# Patient Record
Sex: FEMALE | Race: BLACK OR AFRICAN AMERICAN | Employment: UNEMPLOYED | URBAN - METROPOLITAN AREA
[De-identification: names, ages, dates, MRNs, and addresses within clinical notes are randomized per-mention and may not be internally consistent; named-entity substitution may affect disease eponyms.]

---

## 2017-10-11 ENCOUNTER — OFFICE VISIT (OUTPATIENT)
Dept: OBGYN CLINIC | Age: 27
End: 2017-10-11

## 2017-10-11 ENCOUNTER — HOSPITAL ENCOUNTER (OUTPATIENT)
Dept: LAB | Age: 27
Discharge: HOME | End: 2017-10-11
Payer: COMMERCIAL

## 2017-10-11 VITALS
DIASTOLIC BLOOD PRESSURE: 78 MMHG | HEART RATE: 68 BPM | SYSTOLIC BLOOD PRESSURE: 125 MMHG | BODY MASS INDEX: 30.66 KG/M2 | HEIGHT: 65 IN | WEIGHT: 184 LBS

## 2017-10-11 DIAGNOSIS — N93.9 ABNORMAL UTERINE BLEEDING (AUB): ICD-10-CM

## 2017-10-11 DIAGNOSIS — N93.9 ABNORMAL UTERINE BLEEDING (AUB): Primary | ICD-10-CM

## 2017-10-11 DIAGNOSIS — E66.9 OBESITY (BMI 30.0-34.9): ICD-10-CM

## 2017-10-11 PROCEDURE — 87491 CHLMYD TRACH DNA AMP PROBE: CPT | Performed by: OBSTETRICS & GYNECOLOGY

## 2017-10-11 NOTE — PATIENT INSTRUCTIONS
Abnormal Uterine Bleeding: Care Instructions  Your Care Instructions  Abnormal uterine bleeding (AUB) is irregular bleeding from the uterus that is longer or heavier than usual or does not occur at your regular time. Sometimes it is caused by changes in hormone levels. It can also be caused by growths in the uterus, such as fibroids or polyps. Sometimes a cause cannot be found. You may have heavy bleeding when you are not expecting your period. Your doctor may suggest a pregnancy test, if you think you are pregnant. Follow-up care is a key part of your treatment and safety. Be sure to make and go to all appointments, and call your doctor if you are having problems. It's also a good idea to know your test results and keep a list of the medicines you take. How can you care for yourself at home? · Be safe with medicines. Take pain medicines exactly as directed. ¨ If the doctor gave you a prescription medicine for pain, take it as prescribed. ¨ If you are not taking a prescription pain medicine, ask your doctor if you can take an over-the-counter medicine. · You may be low in iron because of blood loss. Eat a balanced diet that is high in iron and vitamin C. Foods rich in iron include red meat, shellfish, eggs, beans, and leafy green vegetables. Talk to your doctor about whether you need to take iron pills or a multivitamin. When should you call for help? Call 911 anytime you think you may need emergency care. For example, call if:  · You passed out (lost consciousness). Call your doctor now or seek immediate medical care if:  · You have sudden, severe pain in your belly or pelvis. · You have severe vaginal bleeding. You are soaking through your usual pads or tampons every hour for 2 or more hours. · You feel dizzy or lightheaded, or you feel like you may faint. Watch closely for changes in your health, and be sure to contact your doctor if:  · You have new belly or pelvic pain.   · You have a fever.  · Your bleeding gets worse or lasts longer than 1 week. · You think you may be pregnant. Where can you learn more? Go to http://theresa-stacy.info/. Enter E704 in the search box to learn more about \"Abnormal Uterine Bleeding: Care Instructions. \"  Current as of: October 13, 2016  Content Version: 11.3  © 8010-3636 [employer]. Care instructions adapted under license by [employer] (which disclaims liability or warranty for this information). If you have questions about a medical condition or this instruction, always ask your healthcare professional. [place] [address] any warranty or liability for your use of this information.

## 2017-10-11 NOTE — PROGRESS NOTES
Roge Lou is a 23 y.o. female presents today c/o painful and abnormal vaginal bleeding for the past 2 years. Thinks her unintentional BTL caused it. States she was told she signed the consent for BTL just prior to her last LTCS, but claims that's not what she signed for. Reports heavy menses since then, regular interval and duration but heavy flow, wears large pads (diaper type) and soaks q 3-4 hours, passing large clots. Associated with severe menstrual cramps, unrelieved by NSAIDs and heat. Takes narcotics from ER. Agrees this is not good for long term management. Denies dizziness. Review of Systems:   General ROS: negative for - fever, chills, malaise  Endocrine ROS: negative for -  breast tenderness/mass/nipple discharge/galactorrhea, hair pattern changes, skin changes or temperature intolerance. Respiratory ROS: no cough, shortness of breath, or wheezing  Cardiovascular ROS: no chest pain or dyspnea on exertion  Gastrointestinal ROS: no abdominal pain, change in bowel habits, or black or bloody stools, nausea, vomiting  Genito-Urinary ROS: no dysuria, trouble voiding, incontinence or hematuria. No unusual discharge, vaginal dryness, sx of pelvic organ prolapse. Musculoskeletal ROS: negative  Neurological ROS: no TIA or stroke symptoms  Dermatological ROS: negative    OB History      Para Term  AB Living    10 8 5 3 2 8    SAB TAB Ectopic Molar Multiple Live Births    2     8        Gyn hx:  Regular since menarche.   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 Social History Main Topics    Smoking status: Never Smoker    Smokeless tobacco: Never Used    Alcohol use No    Drug use: Yes     Special: Marijuana      Comment: occasionally    Sexual activity: Yes     Partners: Male     Birth control/ protection: Surgical     Other Topics Concern    Blood Transfusions No     Social History Narrative    No narrative on file     No Known Allergies  Prior to Admission medications    Medication Sig Start Date End Date Taking? Authorizing Provider   FERROUS SULFATE, DRIED (IRON, DRIED, PO) Take  by mouth. Yes Historical Provider        Objective:     Vitals:    10/11/17 1359   BP: 125/78   Pulse: 68   Weight: 184 lb (83.5 kg)   Height: 5' 5\" (1.651 m)     Body mass index is 30.62 kg/(m^2). Physical Exam:  General appearance - well appearing, and in no distress and well hydrated  Mental status - alert, oriented to person, place, and time, normal mood, behavior, speech, dress, motor activity, and thought processes  Respiratory:  Normal respiratory effort, no intercostal retractions or use of accessory muscles. Abdomen - soft, nontender, nondistended, no masses or organomegaly  Pelvic - No inguinal lymphadenopathy, normal urethral meatus and no bladder tenderness. VULVA: normal appearing vulva with no masses, tenderness or lesions, VAGINA: normal appearing vagina with normal color and moderate blood in vault, no lesions, PELVIC FLOOR EXAM: no cystocele, rectocele or prolapse noted, DNA probe for chlamydia and GC obtained, CERVIX: normal appearing cervix without discharge or lesions, UTERUS: uterus is enlarged approximately 12 cm, bulky and nontender, mobile, ADNEXA: normal adnexa in size, nontender and no masses,   Extremities - No edema. Skin - normal coloration and turgor, no rashes, no suspicious skin lesions noted    Assessment/Plan:       ICD-10-CM ICD-9-CM    1. Abnormal uterine bleeding (AUB) N93.9 626.9 US PELV NON OB W TV      CHLAMYDIA/NEISSERIA/TRICHOMONAS AMP   2. Obesity (BMI 30.0-34. 9) E66.9 278.00      DDX of AUB using PALM COEIN classification discussed.  We focussed our discussion on hormonal fluctuations especially as it relates to obesity/peripheral conversion of estrogen and its association with endometrial hyperplasia/malignancy as well as structural abnormalities such as polyps/fibroids  Pelvic US recommended. We briefly discussed observation vs medical vs surgical management based on current symptoms.    Caloric restrictions and regular exercise strongly encouraged. Follow up 2 weeks to discuss US findings and plan of care. Plan of care discussed. Patient expressed understanding and agreement.              Signed By:  Pavel Dinh DO     October 11, 2017

## 2017-10-13 DIAGNOSIS — A59.01 TRICHOMONAL VAGINITIS: Primary | ICD-10-CM

## 2017-10-13 LAB
C TRACH RRNA SPEC QL NAA+PROBE: NEGATIVE
N GONORRHOEA RRNA SPEC QL NAA+PROBE: NEGATIVE
SPECIMEN SOURCE: ABNORMAL
T VAGINALIS RRNA SPEC QL NAA+PROBE: POSITIVE

## 2017-10-13 RX ORDER — METRONIDAZOLE 500 MG/1
2000 TABLET ORAL ONCE
Qty: 4 TAB | Refills: 0 | Status: SHIPPED | OUTPATIENT
Start: 2017-10-13 | End: 2017-10-13

## 2017-10-13 NOTE — PROGRESS NOTES
rx sent to pharmacy. pls notify pt and advise to abstain at least 7 days after pt and partner are treated.

## 2017-10-16 NOTE — PROGRESS NOTES
Patient notified and voices understandin. Positive Trichomonas,  2. Rx sent to [facility]. Review instructions to have Rx transferred to her preferred pharmacy. 3. Patient partner can be treated at local health dept. Advised to abstain x7 days once pt and partner completes treatment.

## 2020-05-18 ENCOUNTER — VIRTUAL VISIT (OUTPATIENT)
Dept: FAMILY MEDICINE CLINIC | Age: 30
End: 2020-05-18

## 2020-05-18 ENCOUNTER — HOSPITAL ENCOUNTER (OUTPATIENT)
Dept: LAB | Age: 30
Discharge: HOME | End: 2020-05-18
Payer: COMMERCIAL

## 2020-05-18 DIAGNOSIS — E66.3 OVERWEIGHT: ICD-10-CM

## 2020-05-18 DIAGNOSIS — Z86.2 HISTORY OF ANEMIA: ICD-10-CM

## 2020-05-18 DIAGNOSIS — I10 HYPERTENSION, UNSPECIFIED TYPE: ICD-10-CM

## 2020-05-18 DIAGNOSIS — L93.0 LUPUS ERYTHEMATOSUS, UNSPECIFIED FORM: ICD-10-CM

## 2020-05-18 DIAGNOSIS — R87.619 ABNORMAL CERVICAL PAPANICOLAOU SMEAR, UNSPECIFIED ABNORMAL PAP FINDING: ICD-10-CM

## 2020-05-18 DIAGNOSIS — M54.41 ACUTE RIGHT-SIDED LOW BACK PAIN WITH RIGHT-SIDED SCIATICA: Primary | ICD-10-CM

## 2020-05-18 LAB
ALBUMIN SERPL-MCNC: 4 G/DL (ref 3.4–5)
ALBUMIN/GLOB SERPL: 1.1 {RATIO} (ref 0.8–1.7)
ALP SERPL-CCNC: 82 U/L (ref 45–117)
ALT SERPL-CCNC: 14 U/L (ref 13–56)
ANION GAP SERPL CALC-SCNC: 5 MMOL/L (ref 3–18)
APPEARANCE UR: ABNORMAL
AST SERPL-CCNC: 12 U/L (ref 10–38)
BACTERIA URNS QL MICRO: ABNORMAL /HPF
BASOPHILS # BLD: 0 K/UL (ref 0–0.1)
BASOPHILS NFR BLD: 0 % (ref 0–2)
BILIRUB SERPL-MCNC: 0.3 MG/DL (ref 0.2–1)
BILIRUB UR QL: NEGATIVE
BUN SERPL-MCNC: 11 MG/DL (ref 7–18)
BUN/CREAT SERPL: 15 (ref 12–20)
CALCIUM SERPL-MCNC: 9.2 MG/DL (ref 8.5–10.1)
CHLORIDE SERPL-SCNC: 112 MMOL/L (ref 100–111)
CO2 SERPL-SCNC: 25 MMOL/L (ref 21–32)
COLOR UR: YELLOW
CREAT SERPL-MCNC: 0.74 MG/DL (ref 0.6–1.3)
DIFFERENTIAL METHOD BLD: ABNORMAL
EOSINOPHIL # BLD: 0 K/UL (ref 0–0.4)
EOSINOPHIL NFR BLD: 0 % (ref 0–5)
EPITH CASTS URNS QL MICRO: ABNORMAL /LPF (ref 0–5)
ERYTHROCYTE [DISTWIDTH] IN BLOOD BY AUTOMATED COUNT: 14.6 % (ref 11.6–14.5)
EST. AVERAGE GLUCOSE BLD GHB EST-MCNC: 123 MG/DL
FERRITIN SERPL-MCNC: 37 NG/ML (ref 8–388)
GLOBULIN SER CALC-MCNC: 3.7 G/DL (ref 2–4)
GLUCOSE SERPL-MCNC: 96 MG/DL (ref 74–99)
GLUCOSE UR STRIP.AUTO-MCNC: NEGATIVE MG/DL
HBA1C MFR BLD: 5.9 % (ref 4.2–5.6)
HCT VFR BLD AUTO: 35.2 % (ref 35–45)
HGB BLD-MCNC: 11.9 G/DL (ref 12–16)
HGB UR QL STRIP: NEGATIVE
IRON SATN MFR SERPL: 11 % (ref 20–50)
IRON SERPL-MCNC: 41 UG/DL (ref 50–175)
KETONES UR QL STRIP.AUTO: NEGATIVE MG/DL
LEUKOCYTE ESTERASE UR QL STRIP.AUTO: ABNORMAL
LYMPHOCYTES # BLD: 2.8 K/UL (ref 0.9–3.6)
LYMPHOCYTES NFR BLD: 39 % (ref 21–52)
MCH RBC QN AUTO: 28.8 PG (ref 24–34)
MCHC RBC AUTO-ENTMCNC: 33.8 G/DL (ref 31–37)
MCV RBC AUTO: 85.2 FL (ref 74–97)
MONOCYTES # BLD: 0.5 K/UL (ref 0.05–1.2)
MONOCYTES NFR BLD: 7 % (ref 3–10)
NEUTS SEG # BLD: 3.9 K/UL (ref 1.8–8)
NEUTS SEG NFR BLD: 54 % (ref 40–73)
NITRITE UR QL STRIP.AUTO: NEGATIVE
PH UR STRIP: 6.5 [PH] (ref 5–8)
PLATELET # BLD AUTO: 178 K/UL (ref 135–420)
PMV BLD AUTO: 13.7 FL (ref 9.2–11.8)
POTASSIUM SERPL-SCNC: 3.8 MMOL/L (ref 3.5–5.5)
PROT SERPL-MCNC: 7.7 G/DL (ref 6.4–8.2)
PROT UR STRIP-MCNC: NEGATIVE MG/DL
RBC # BLD AUTO: 4.13 M/UL (ref 4.2–5.3)
RBC #/AREA URNS HPF: NEGATIVE /HPF (ref 0–5)
SODIUM SERPL-SCNC: 142 MMOL/L (ref 136–145)
SP GR UR REFRACTOMETRY: 1.02 (ref 1–1.03)
TIBC SERPL-MCNC: 389 UG/DL (ref 250–450)
TSH SERPL DL<=0.05 MIU/L-ACNC: 0.86 UIU/ML (ref 0.36–3.74)
UROBILINOGEN UR QL STRIP.AUTO: 1 EU/DL (ref 0.2–1)
WBC # BLD AUTO: 7.2 K/UL (ref 4.6–13.2)
WBC URNS QL MICRO: ABNORMAL /HPF (ref 0–4)

## 2020-05-18 PROCEDURE — 86225 DNA ANTIBODY NATIVE: CPT

## 2020-05-18 PROCEDURE — 84443 ASSAY THYROID STIM HORMONE: CPT

## 2020-05-18 PROCEDURE — 80053 COMPREHEN METABOLIC PANEL: CPT

## 2020-05-18 PROCEDURE — 83036 HEMOGLOBIN GLYCOSYLATED A1C: CPT

## 2020-05-18 PROCEDURE — 81001 URINALYSIS AUTO W/SCOPE: CPT

## 2020-05-18 PROCEDURE — 85025 COMPLETE CBC W/AUTO DIFF WBC: CPT

## 2020-05-18 PROCEDURE — 82728 ASSAY OF FERRITIN: CPT

## 2020-05-18 PROCEDURE — 83540 ASSAY OF IRON: CPT

## 2020-05-18 PROCEDURE — 36415 COLL VENOUS BLD VENIPUNCTURE: CPT

## 2020-05-18 RX ORDER — CYCLOBENZAPRINE HCL 10 MG
10 TABLET ORAL
Qty: 30 TAB | Refills: 0 | Status: SHIPPED | OUTPATIENT
Start: 2020-05-18

## 2020-05-18 RX ORDER — DICLOFENAC SODIUM 50 MG/1
50 TABLET, DELAYED RELEASE ORAL 2 TIMES DAILY
Qty: 30 TAB | Refills: 0 | Status: SHIPPED | OUTPATIENT
Start: 2020-05-18 | End: 2020-06-30 | Stop reason: SDUPTHER

## 2020-05-18 NOTE — PROGRESS NOTES
Cassy Zepeda is a 30 y.o. female who was seen by synchronous (real-time) audio-video technology on 5/18/2020. Consent: Cassy Zepeda, who was seen by synchronous (real-time) audio-video technology, and/or her healthcare decision maker, is aware that this patient-initiated, Telehealth encounter on 5/18/2020 is a billable service, with coverage as determined by her insurance carrier. She is aware that she may receive a bill and has provided verbal consent to proceed: Yes. Assessment & Plan:   Diagnoses and all orders for this visit:    1. Acute right-sided low back pain with right-sided sciatica  Will treat symptomatically today- patient to activate [employer] so we can message and I can send her information on back exercises. If no improvement in 4 weeks consider imaging, referral to PT/Ortho.    -     diclofenac EC (VOLTAREN) 50 mg EC tablet; Take 1 Tab by mouth two (2) times a day. -     cyclobenzaprine (FLEXERIL) 10 mg tablet; Take 1 Tab by mouth daily as needed for Muscle Spasm(s). 2. Lupus erythematosus, unspecified form  Will obtain lab work today; will also preemptively refer to rheumatology for management  -     [address]; Future  -     CBC WITH AUTOMATED DIFF; Future  -     OSIEL COMPREHENSIVE PANEL; Future  -     URINALYSIS W/ RFLX MICROSCOPIC; Future    3. Hypertension, unspecified type  Screening lab work today; will follow up in 4 weeks- begin treatment if indicated. Today educated on low salt diet, exercise. 4. History of anemia  Labs ordered today  -     CBC WITH AUTOMATED DIFF; Future  -     IRON PROFILE; Future  -     FERRITIN; Future    5. Abnormal cervical Papanicolaou smear, unspecified abnormal pap finding  No records available but will refer to GYN for management.   -     REFERRAL TO GYNECOLOGY    6. Overweight  Some labs ordered today. Educated on diet and exercise. Once [employer] activated will send further information.  Follow up for weight in office.   -     HEMOGLOBIN A1C WITH EAG; Future  -     TSH 3RD GENERATION; Future    Follow-up and Dispositions    · Return in about 4 weeks (around 6/15/2020), or if symptoms worsen or fail to improve, for in office evaluation; labs ASAP. Fanny Sahu I spent at least 29 minutes on this visit with this new patient. [phone]  Subjective:   Karen Chandler is a 30 y.o. female who was seen for Establish Care    Establishing care today- has not seen PCP previously. Has followed intermittently with GYN with pregnancies. Has 8 children. Various acute and chronic complaints today detailed below. Back pain- this flared up 2.5 weeks ago after injury at work. 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 Located lower back, mostly on right side. Certain movements make it worse, numbness goes down right leg. She reports muscle spasms and tightness in back too. For relief has used topical patch with minimal relief, but some help. Rates pain 10/10. Has not tried OTC medication tylenol as she has been dizzy after using this before. Has not tried OTC motrin, although she does have 800 mg ibuprofen has not used because it is prescribed for period cramps and does not want to waste it. Per chart review as upcoming appointment with spine specialist, Dr. Donna Chatman at [facility]. Lupus- flare up described today with rash noted to face, arms. Also states kidneys have been affected but did not detail how. She reports that this was diagnosed at 7years old. She states she had overdosed and lupus discovered on lab work. No labs available indicating lupus. She reports strong family history of lupus as well, mother dying in 2017. She states she has never followed with rheumatology before. HTN- diagnosed with pregnancy previously. Reports having to deliver babies prematurely due to BP concerns.  Has been noted intermittently outside of pregnancy- patient monitors occasionally with BP monitor for work and [id number]. Does sometime report intermittent dizziness, headaches, swollen feet, tingling hands. Has not been diagnosed outside of pregnancy. She does report she took 'something' for high blood pressure previously, but weaned self off it. Anemia- patient reports iron deficiency anemia previously and was supposed to be on iron supplements. Issues with GI upset and constipation so she stopped. Does report intermittent dizziness, feeling cold. Abnormal pap smears/Pre-cancerous cells- difficult to obtain full information from patient. She reports she did have LEEP procedure and pre cancerous cells found. This was in 2018, per patient. No records available. She has not followed up. Offered PAP smear here, but decided to preemptively refer to GYN for management as likely will be abnormal.     Overweight- no current weight on file, no scale at home. Patient reports going from size 11 - 13 in pants size. Patient also asking to be screened for diabetes due to family history. No Known Allergies    Patient Active Problem List   Diagnosis Code    Abnormal uterine bleeding (AUB) N93.9    Obesity (BMI 30.0-34. 9) E66.9    Trichomonal vaginitis A59.01       Past Medical History:   Diagnosis Date    Abnormal Papanicolaou smear of cervix     biopsy     Anemia     reports iron deficinecy anemia, not treated presently    Autoimmune disease ([address] 2003    lupus    Cancer ([facility] [address]     pre-cancerous cells seen on LEEP procedure per patient    Hypertension     diagnosed in pregnancy; has not seen provider outside of pregnancy    Preeclampsia     Trichomonal vaginitis 10/13/2017     Past Surgical History:   Procedure Laterality Date    HX  SECTION      HX LEEP PROCEDURE      HX TUBAL LIGATION       Family History   Problem Relation Age of Onset    Diabetes Mother     Arthritis-osteo Mother     Heart Disease Mother     Hypertension Mother     Lupus Mother     Diabetes Maternal Aunt      Social History     Tobacco Use    Smoking status: Never Smoker    Smokeless tobacco: Never Used   Substance Use Topics    Alcohol use: No       Review of Systems   Constitutional: Positive for malaise/fatigue. Negative for chills and fever. Eyes: Negative for blurred vision. Respiratory: Negative for cough, sputum production and shortness of breath. Cardiovascular: Positive for leg swelling. Negative for chest pain, palpitations, orthopnea and claudication. Genitourinary: Negative for dysuria, flank pain and urgency. Musculoskeletal: Positive for back pain and myalgias. Skin: Positive for rash. Negative for itching. Neurological: Positive for dizziness, tingling and headaches. Negative for tremors, speech change, focal weakness and weakness. Endo/Heme/Allergies: Negative for polydipsia. Objective: There were no vitals taken for this visit. General: alert, cooperative, no distress   Mental  status: normal mood, behavior, speech, dress, motor activity, and thought processes, able to follow commands   HENT: NCAT   Neck: no visualized mass   Resp: no respiratory distress   Neuro: no gross deficits   Skin: no discoloration or lesions of concern on visible areas   Psychiatric: normal affect, consistent with stated mood, no evidence of hallucinations     Additional exam findings: appeared well, non-toxic on video encounter. Small papular rash noted to backside of left arm. We discussed the expected course, resolution and complications of the diagnosis(es) in detail. Medication risks, benefits, costs, interactions, and alternatives were discussed as indicated. I advised her to contact the office if her condition worsens, changes or fails to improve as anticipated. She expressed understanding with the diagnosis(es) and plan.      Claribel Osorio is a 30 y.o. female who was evaluated by a video visit encounter for concerns as above. Patient identification was verified prior to start of the visit. A caregiver was present when appropriate. Due to this being a TeleHealth encounter (During DVBEY-02 public health emergency), evaluation of the following organ systems was limited: Vitals/Constitutional/EENT/Resp/CV/GI//MS/Neuro/Skin/Heme-Lymph-Imm. Pursuant to the emergency declaration under the [facility] waiver authority and the [employer] Act, this Virtual  Visit was conducted, with patient's (and/or legal guardian's) consent, to reduce the patient's risk of exposure to COVID-19 and provide necessary medical care. Services were provided through a video synchronous discussion virtually to substitute for in-person clinic visit. Patient and provider were located at their individual homes.       Garry Watters NP

## 2020-05-18 NOTE — Clinical Note
Please help Ms La Zavala get scheduled for in office visit in 4-6 weeks. Labs ordered she will get done outpatient at [facility] or [facility]. 2 referrals today- rheumatology and gyn.

## 2020-05-19 ENCOUNTER — TELEPHONE (OUTPATIENT)
Dept: FAMILY MEDICINE CLINIC | Age: 30
End: 2020-05-19

## 2020-05-19 NOTE — TELEPHONE ENCOUNTER
Patient stated that she did not know about the appointment that was set up with [facility]-we can disregard any calls from [facility]

## 2020-05-19 NOTE — TELEPHONE ENCOUNTER
attempted to call patient to review lab resulted so far, no answer. Voicemail box full. Will attempt [employer] message.

## 2020-05-19 NOTE — TELEPHONE ENCOUNTER
[facility] called stating patient contacted them about a referral for lower back pain. (I didn't see a referral for this) They do not take patient's insurance.

## 2020-05-20 ENCOUNTER — TELEPHONE (OUTPATIENT)
Dept: FAMILY MEDICINE CLINIC | Age: 30
End: 2020-05-20

## 2020-05-20 DIAGNOSIS — N39.0 URINARY TRACT INFECTION WITHOUT HEMATURIA, SITE UNSPECIFIED: Primary | ICD-10-CM

## 2020-05-20 DIAGNOSIS — M79.89 SWELLING OF RIGHT HAND: ICD-10-CM

## 2020-05-20 LAB
CENTROMERE B AB SER-ACNC: <0.2 AI (ref 0–0.9)
CHROMATIN AB SERPL-ACNC: <0.2 AI (ref 0–0.9)
DSDNA AB SER-ACNC: 1 IU/ML (ref 0–9)
ENA JO1 AB SER-ACNC: <0.2 AI (ref 0–0.9)
ENA RNP AB SER-ACNC: 0.2 AI (ref 0–0.9)
ENA SCL70 AB SER-ACNC: <0.2 AI (ref 0–0.9)
ENA SM AB SER-ACNC: <0.2 AI (ref 0–0.9)
ENA SS-A AB SER-ACNC: <0.2 AI (ref 0–0.9)
ENA SS-B AB SER-ACNC: <0.2 AI (ref 0–0.9)
SEE BELOW, 164869: NORMAL

## 2020-05-20 NOTE — TELEPHONE ENCOUNTER
Patient called for her lab results. Pt want to be contacted today if possible. Please follow up when available.

## 2020-05-21 ENCOUNTER — VIRTUAL VISIT (OUTPATIENT)
Dept: ORTHOPEDIC SURGERY | Age: 30
End: 2020-05-21

## 2020-05-21 DIAGNOSIS — M54.31 RIGHT SIDED SCIATICA: Primary | ICD-10-CM

## 2020-05-21 DIAGNOSIS — S39.012A STRAIN OF LUMBAR REGION, INITIAL ENCOUNTER: ICD-10-CM

## 2020-05-21 RX ORDER — NITROFURANTOIN 25; 75 MG/1; MG/1
100 CAPSULE ORAL 2 TIMES DAILY
Qty: 10 CAP | Refills: 0 | Status: SHIPPED | OUTPATIENT
Start: 2020-05-21 | End: 2020-06-30

## 2020-05-21 RX ORDER — PREDNISONE 5 MG/1
TABLET ORAL
Qty: 21 TAB | Refills: 0 | Status: SHIPPED | OUTPATIENT
Start: 2020-05-21 | End: 2020-06-30

## 2020-05-21 NOTE — TELEPHONE ENCOUNTER
Returned patient's call at this time. Varied concerns. 1. Right hand swollen, red. She reports this is a lupus flare up. reittereated that OSIEL negative on labs but nonetheless she needs to follow up with rheumatologist previously referred to. She is in agreement. She states that it is swollen and causing the skin to feel tight, and red. Not necessarily painful. She denies any trauma to the area, no bite, etc. She is requesting a steroid. 2. UTI symptoms- labs reviewed with bacteria present in UA. Will treat with macrobid. 3. Pre-diabetes- patient confused on diagnosis of this, asking for medication. Advised that while treatment with metformin is possibility, we will first allow for lifestyle changes. Advised on diet and exercise, will re-check A1c q3 months. Discussed side effects associated with metformin and if she can manage without medication this would be preferred.

## 2020-05-21 NOTE — TELEPHONE ENCOUNTER
Patient called advising that her right hand is swollen, numb, tingling and is bright red. Patient woke up this morning like this. No pain no fever. Please call [phone].      Lab results were given and patient verbalized understanding
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
## 2020-05-21 NOTE — PROGRESS NOTES
Verbal order entered per Dr. Telly Burt as documented on blue sheet:Referral to PT    Faiza Carvajal presents today for   Chief Complaint   Patient presents with    Back Pain     Virtual Visit    Hand Pain       Is someone accompanying this pt? Virtual Visit    Is the patient using any DME equipment during OV? NA    Coordination of Care:  1. Have you been to the ER, urgent care clinic since your last visit? NO  Hospitalized since your last visit? NO    2. Have you seen or consulted any other health care providers outside of the [address] [place] [address] since your last visit? NO Include any pap smears or colon screening.  NO    Last  Checked 5/21/2020

## 2020-05-28 ENCOUNTER — HOSPITAL ENCOUNTER (OUTPATIENT)
Dept: PHYSICAL THERAPY | Age: 30
Discharge: HOME | End: 2020-05-28
Payer: COMMERCIAL

## 2020-05-28 PROCEDURE — 97162 PT EVAL MOD COMPLEX 30 MIN: CPT

## 2020-05-28 NOTE — PROGRESS NOTES
[facility]              [address] [facility]        [place], [address]   [phone] fax    Plan of Care/ Statement of Necessity for Physical Therapy Services  Patient name: Michael Bonds Start of Care: 2020   Referral source: Oxana Londono MD : 1990    Medical Diagnosis: Low back pain [M54.5]  Payor: Enrrique Tang / Plan: 1500 / Product Type: Medicaid /  Onset Date:2020    Treatment Diagnosis: Right Lumbar Strain   Prior Hospitalization: see medical history Provider#: [id number]   Medications: Verified on Patient summary List    Comorbidities: Diabetes Type I, HTN   Prior Level of Function: (I) Functional ADLs, (I) Self-Care ADLs, Work Part-Time, Mother (children x8)     The Plan of Care and following information is based on the information from the initial evaluation. Assessment:    Patient presents with s/s consistent with right lumbar strain with normal neurological screen and without (+) red flag findings. Patient does report intermittent dizziness and bilateral UE numbness/tingling with patient advised to address with PCP with recent diagnosis of Diabetes mellitus Type I. Poor recruitment of anterior abdominal musculature with (+) Frackville's sign demonstrated. To promote improvement in recruitment and activation of the anterior abdominal and gluteal musculature with progressive stretching of strained musculature to improve activity tolerance and return to PLOF.     Patient will continue to benefit from skilled PT services to modify and progress therapeutic interventions, address functional mobility deficits, address ROM deficits, address strength deficits, analyze and address soft tissue restrictions, analyze and cue movement patterns, analyze and modify body mechanics/ergonomics and assess and modify postural abnormalities to attain remaining goals.     Key Information:    Posture: Normal; No observable lateral shift, Symmetrical positioning of iliac crest bilaterally     Gait:       [x]? Normal        Active Movements: []? N/A   []? Too acute   []? Other:  ROM % AROM Comments   Forward flexion 40-60 10% limited Central lumbar pull   Extension 20-30 90% limited Pain 10/10   SB right 20-30 50% limited     SB left 20-30 50% limited Right lumbar stretch      Neuro Screen []? WNL  Dermatome: Symmetrical sensation to light touch bilaterally L2-S1  Reflexes: 0+ L/R Patellar, 2+ L/R Achilles     Dural Mobility:  SLR Supine: (-) Right    Evaluation Complexity History MEDIUM  Complexity : 1-2 comorbidities / personal factors will impact the outcome/ POC ; Examination MEDIUM Complexity : 3 Standardized tests and measures addressing body structure, function, activity limitation and / or participation in recreation  ;Presentation MEDIUM Complexity : Evolving with changing characteristics  ; Clinical Decision Making MEDIUM Complexity : FOTO score of 26-74  Overall Complexity Rating: MEDIUM  Problem List: pain affecting function, decrease ROM, decrease strength, decrease ADL/ functional abilitiies, decrease activity tolerance, decrease flexibility/ joint mobility and decrease transfer abilities   Treatment Plan may include any combination of the following: Therapeutic exercise, Therapeutic activities, Neuromuscular re-education, Physical agent/modality, Manual therapy, Patient education, Self Care training, Functional mobility training and Home safety training  Patient / Family readiness to learn indicated by: asking questions, trying to perform skills and interest  Persons(s) to be included in education: patient (P)  Barriers to Learning/Limitations: None  Patient Goal (s): Be able to move normal without pain  Patient Self Reported Health Status: fair  Rehabilitation Potential: good    Short Term Goals: To be accomplished in 3 weeks:  1. Patient will subjectively report full compliance with prescribed HEP. Eval: HEP provided  2. Patient will demonstrate lumbar extension AROM </= 50% limited with pain < 5/10 to improve overhead lifting. Eval: Lumbar Extension AROM = 90% limited (lumbar pain 10/10)  3. Patient will demonstrate left/right lumbar sidebend AROM <25% limited to improve ease with household ADLs. Eval: Left Lumbar Sidebend 50% limited, Right Lumbar Sidebend 50% limited     Long Term Goals: To be accomplished in 6 weeks:  1. Patient will demonstrate a significant functional improvement as demonstrated by a score of >/= 69 on FOTO. Eval: FOTO = 51       2. Patient will subjectively report >/= 60% improvement in sleep disturbances to promote improvement in overall quality of life. Eval: 0%  3. Patient will report pain at worse over the last week 5/10 to improve ease with work-related ADLs. Eval: Pain at Worst = 9/10    Frequency / Duration: Patient to be seen 2 times per week for 6 weeks. Patient/ Caregiver education and instruction: Diagnosis, prognosis, self care, activity modification and exercises   [x]  Plan of care has been reviewed with ANDREEA Cheung, PT 5/28/2020 2:29 PM  ________________________________________________________________________    I certify that the above Therapy Services are being furnished while the patient is under my care. I agree with the treatment plan and certify that this therapy is necessary.     Physician's Signature:____________Date:_________TIME:________    ** Signature, Date and Time must be completed for valid certification **  Please sign and return to [facility]/ [address]              [address] [facility], [address]   [phone] fax

## 2020-05-28 NOTE — PROGRESS NOTES
PT DAILY TREATMENT NOTE 10-18    Patient Name: Arlene Felix  Date:2020  : 1990  [x]  Patient  Verified  Payor: [place] / Plan: 1500  / Product Type: Medicaid /    In time:146  Out time:220  Total Treatment Time (min): 34  Visit #: 1 of 12    Treatment Area: Low back pain [M54.5]    Physical Therapy Evaluation - Lumbar    SUBJECTIVE      Any medication changes, allergies to medications, adverse drug reactions, diagnosis change, or new procedure performed?: [x] No    [] Yes (see summary sheet for update)    Subjective functional status/changes:     PLOF: (I) Functional ADLs, (I) Self-Care ADLs, Work Part-Time, Mother (children x8)  Current Functional Status: Difficulty with household ADLs, Difficulty with self-care ADLs, Difficulty with bending, squatting, UE overhead movements  Work Hx: [employer]  Living Situation: Lives with family  Comorbidities: Diabetes Type I, HTN  Medications: None    Subjective: Patient reports right lumbar pain with onset reported while patient was working with a bed-bound patient (PCA) with the middle of 2020 with patient attempting to transfer the patient when the patient went limp with jolting movement. Patient reports immediate onset of pain to the right lumbar region with radiation of symptoms along the right anterior thigh. Patient reports diminished sensation to bilateral fingers/hands and swelling. Patient denies LE N/T with patient denying the following: falls, changes in bowel/bladders. 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 Patient as well reports intermittent dizziness with patient reporting onset ~2020. Pain Intensity (0-10, VAS): Current 9, Worst 9, Best 7    Patient Goals:  \"Be able to move normal without pacing\"    OBJECTIVE EXAMINATION    BP: 108/74 mmHg  Posture: Normal; No observable lateral shift, Symmetrical positioning of iliac crest bilaterally    Gait:  [x] Normal     [] Abnormal:    Functional Tests:  1. SLS: Left SLS 9 sec / Right SLS 10 sec    Active Movements: [] N/A   [] Too acute   [] Other:  ROM % AROM Comments   Forward flexion 40-60 10% limited Central lumbar pull   Extension 20-30 90% limited Pain 10/10   SB right 20-30 50% limited    SB left 20-30 50% limited Right lumbar stretch     Neuro Screen [] WNL  Dermatome: Symmetrical sensation to light touch bilaterally L2-S1  Reflexes: 0+ L/R Patellar, 2+ L/R Achilles    Dural Mobility:  SLR Supine: (-) Right    Palpation: TTP right QL and generalized lumbar spine    Right Hip Screen: (-) FADDIR, (-) JAROCHO, (-) Scour    LE MMT    Left Right   Hip Flexion 5 5    Extension NT NT    Abduction NT NT    ER 5 5    IR 5 5   Knee Flexion 5 5    Extension 5 5   Ankle Dorsiflexion 5 5    Ankle Eversion 5 5    Hallux Ext 5 5   *Assessed in supine    Other Tests: Right Hip Distraction - Right lumbar \"pull\" reported without increase in pain    OBJECTIVE    34 min [x]Eval                  []Re-Eval     See Eval min Therapeutic Exercise:  [x] See flow sheet : Patient educated regarding completion of prescribed HEP and provided with written HEP instructions, Patient educated regarding diagnosis and PT POC    Supine, Right Hip Long Axis Distraction (Gentle) x5 minutes    **Performance of the above, included within evaluation, to aid in assessment of patient and aid in development of appropriate plan of care   Rationale: increase ROM and increase strength to improve the patients ability to improve ease with functional ADLs          With   [] TE   [] TA   [] neuro   [] other: Patient Education: [x] Review HEP    [] Progressed/Changed HEP based on:   [] positioning   [] body mechanics   [] transfers   [] heat/ice application    [] other:      Other Objective/Functional Measures: See objective above.      Pain Level (0-10 scale) post treatment: 8    ASSESSMENT/Changes in Function: Patient presents with s/s consistent with right lumbar strain with normal neurological screen and without (+) red flag findings. Patient does report intermittent dizziness and bilateral UE numbness/tingling with patient advised to address with PCP with recent diagnosis of Diabetes mellitus Type I. Poor recruitment of anterior abdominal musculature with (+) Adeline's sign demonstrated. To promote improvement in recruitment and activation of the anterior abdominal and gluteal musculature with progressive stretching of strained musculature to improve activity tolerance and return to PLOF. Patient will continue to benefit from skilled PT services to modify and progress therapeutic interventions, address functional mobility deficits, address ROM deficits, address strength deficits, analyze and address soft tissue restrictions, analyze and cue movement patterns, analyze and modify body mechanics/ergonomics and assess and modify postural abnormalities to attain remaining goals. [x]  See Plan of Care  []  See progress note/recertification  []  See Discharge Summary         Progress towards goals / Updated goals:    Short Term Goals: To be accomplished in 3 weeks:  1. Patient will subjectively report full compliance with prescribed HEP. Eval: HEP provided  2. Patient will demonstrate lumbar extension AROM </= 50% limited with pain < 5/10 to improve overhead lifting. Eval: Lumbar Extension AROM = 90% limited (lumbar pain 10/10)  3. Patient will demonstrate left/right lumbar sidebend AROM <25% limited to improve ease with household ADLs. Eval: Left Lumbar Sidebend 50% limited, Right Lumbar Sidebend 50% limited     Long Term Goals: To be accomplished in 6 weeks:  1. Patient will demonstrate a significant functional improvement as demonstrated by a score of >/= 69 on FOTO. Eval: FOTO = 51       2. Patient will subjectively report >/= 60% improvement in sleep disturbances to promote improvement in overall quality of life. Eval: 0%  3.  Patient will report pain at worse over the last week 5/10 to improve ease with work-related ADLs.   Eval: Pain at Worst = 9/10      PLAN  [x]  Upgrade activities as tolerated     []  Continue plan of care  []  Update interventions per flow sheet       []  Discharge due to:_  []  Other:_      Aster Cheung, PT 5/28/2020  1:44 PM    Future Appointments   Date Time Provider Saad Kellen   5/28/2020  1:45 PM Liu Lima, PT [facility] [id number]   6/30/2020 11:30 AM GORDO Amador   7/6/2020 11:30 AM Nida Willingham  [address]

## 2020-05-29 ENCOUNTER — VIRTUAL VISIT (OUTPATIENT)
Dept: FAMILY MEDICINE CLINIC | Age: 30
End: 2020-05-29

## 2020-05-29 DIAGNOSIS — R42 DIZZINESS: Primary | ICD-10-CM

## 2020-05-29 RX ORDER — LANOLIN ALCOHOL/MO/W.PET/CERES
1 CREAM (GRAM) TOPICAL
COMMUNITY

## 2020-05-29 NOTE — PROGRESS NOTES
Jovita Mccord is a 30 y.o. female who was seen by synchronous (real-time) audio-video technology on 5/29/2020. Consent: Jovita Mccord, who was seen by synchronous (real-time) audio-video technology, and/or her healthcare decision maker, is aware that this patient-initiated, Telehealth encounter on 5/29/2020 is a billable service, with coverage as determined by her insurance carrier. She is aware that she may receive a bill and has provided verbal consent to proceed: Yes. Assessment & Plan:   Diagnoses and all orders for this visit:    1. Dizziness    patient is to keep log of BP and will follow up in a few days to discuss log    I spent at least 10 minutes on this visit with this established patient. 712  Subjective:   Jovita Mccord is a 30 y.o. female who was seen for Hypotension    Patient called today to schedule virtual visit with concern for low blood pressure    Low BP- she states at PT session she had extremely low BP and only did laying down exercises. She cannot recall exact BP reading but said the physical therapist had a facial expression that looked 'extremely concerned'. Office note from PT not yet available, but on review of unsigned note (still in progress) it looks like her blood pressure was 108/74- which would be a beautiful reading and I would not be concerned. She reports she occasionally does have weeks long of feeling very tired and cant get out of bed. She states last weekend she was in the yard with the kids and had very lightheaded feeling, dizzy, faint and shaking, dry mouth. Laying down helped her to recover. She was recently found to have UTI and has yet to  antibiotics for this, she denies fever, no concern for sepsis. She was found to be mildly anemic with last lab work but not concerning for need for transfusion with H&H of 11.9 and 35.2- she has since re-started PO iron supplements. She does not have a history of low BP, but has had high BP in pregnancy.  Does not have BP cuff at home but will get one today and start measuring BP. Patient was previously seen for 1 virtual visit to establish care. Back pain (saw orthopedics, referred to PT); reported Lupus (OSIEL negative, referred to Rheumatology; recently reported flare and requested steroids), Iron def anemia (noted on labs and reported; advised to restart iron and vit c which she has); abnormal pap (reported LEEP procedure, referred to GYN); pre-diabetes (found on initial lab work with a1c 5.9%, advised on lifestyle changes)      Prior to Admission medications    Medication Sig Start Date End Date Taking? Authorizing Provider   nitrofurantoin, macrocrystal-monohydrate, (Macrobid) 100 mg capsule Take 1 Cap by mouth two (2) times a day. 20   Ary Ferguson NP   predniSONE (STERAPRED) 5 mg dose pack See administration instruction per 5mg dose pack 20   Ary Ferguson NP   diclofenac EC (VOLTAREN) 50 mg EC tablet Take 1 Tab by mouth two (2) times a day. 20   Ary Ferguson NP   cyclobenzaprine (FLEXERIL) 10 mg tablet Take 1 Tab by mouth daily as needed for Muscle Spasm(s).  20   Ary Ferguson NP     No Known Allergies    Patient Active Problem List    Diagnosis Date Noted    Trichomonal vaginitis 10/13/2017    Abnormal uterine bleeding (AUB) 10/11/2017    Obesity (BMI 30.0-34.9) 10/11/2017     Past Medical History:   Diagnosis Date    Abnormal Papanicolaou smear of cervix     biopsy     Anemia     reports iron deficinecy anemia, not treated presently    Autoimmune disease ([address]     lupus    Cancer ([facility] [address]     pre-cancerous cells seen on LEEP procedure per patient    Hypertension     diagnosed in pregnancy; has not seen provider outside of pregnancy    Preeclampsia     Trichomonal vaginitis 10/13/2017     Past Surgical History:   Procedure Laterality Date    HX  SECTION      HX LEEP PROCEDURE      HX TUBAL LIGATION       Family History   Problem Relation Age of Onset    Diabetes Mother     Arthritis-osteo Mother     Heart Disease Mother     Hypertension Mother     Lupus Mother     Diabetes Maternal Aunt      Social History     Tobacco Use    Smoking status: Never Smoker    Smokeless tobacco: Never Used   Substance Use Topics    Alcohol use: No       Review of Systems   Constitutional: Positive for malaise/fatigue. Negative for chills, diaphoresis, fever and weight loss. Cardiovascular: Negative for chest pain and orthopnea. Genitourinary: Positive for dysuria. Musculoskeletal: Positive for joint pain. Neurological: Positive for dizziness. Negative for headaches. Objective: There were no vitals taken for this visit. General: alert, cooperative, no distress   Mental  status: normal mood, behavior, speech, dress, motor activity, and thought processes, able to follow commands   HENT: NCAT   Neck: no visualized mass   Resp: no respiratory distress   Neuro: no gross deficits   Skin: no discoloration or lesions of concern on visible areas   Psychiatric: normal affect, consistent with stated mood, no evidence of hallucinations     Additional exam findings: We discussed the expected course, resolution and complications of the diagnosis(es) in detail. Medication risks, benefits, costs, interactions, and alternatives were discussed as indicated. I advised her to contact the office if her condition worsens, changes or fails to improve as anticipated. She expressed understanding with the diagnosis(es) and plan. Lisette Llanos is a 30 y.o. female who was evaluated by a video visit encounter for concerns as above. Patient identification was verified prior to start of the visit. A caregiver was present when appropriate.  Due to this being a TeleHealth encounter (During BIBPT-06 public health emergency), evaluation of the following organ systems was limited: Vitals/Constitutional/EENT/Resp/CV/GI//MS/Neuro/Skin/Heme-Lymph-Imm. Pursuant to the emergency declaration under the [address] [place] [address], [facility] waiver authority and the [employer] and [employer] Act, this Virtual  Visit was conducted, with patient's (and/or legal guardian's) consent, to reduce the patient's risk of exposure to COVID-19 and provide necessary medical care. Services were provided through a video synchronous discussion virtually to substitute for in-person clinic visit. Patient and provider were located at their individual homes.       Stevie Pena NP

## 2020-06-01 ENCOUNTER — VIRTUAL VISIT (OUTPATIENT)
Dept: FAMILY MEDICINE CLINIC | Age: 30
End: 2020-06-01

## 2020-06-01 VITALS — SYSTOLIC BLOOD PRESSURE: 122 MMHG | HEART RATE: 60 BPM | DIASTOLIC BLOOD PRESSURE: 77 MMHG

## 2020-06-01 DIAGNOSIS — S69.92XA INJURY OF FINGER OF LEFT HAND, INITIAL ENCOUNTER: ICD-10-CM

## 2020-06-01 DIAGNOSIS — Z01.30 BLOOD PRESSURE CHECK: Primary | ICD-10-CM

## 2020-06-01 NOTE — PROGRESS NOTES
Dewey Brenner is a 30 y.o. female who was seen by synchronous (real-time) audio-video technology on 6/1/2020. Consent: Dewey Brenner, who was seen by synchronous (real-time) audio-video technology, and/or her healthcare decision maker, is aware that this patient-initiated, Telehealth encounter on 6/1/2020 is a billable service, with coverage as determined by her insurance carrier. She is aware that she may receive a bill and has provided verbal consent to proceed: Yes. Assessment & Plan:   Diagnoses and all orders for this visit:    1. Blood pressure check  Patient advised on appropriate blood pressure range today. Told to continue physical therapy per orthopedics recommendation. Advised to follow up with rheumatology for investigation of lupus, which patient reports previously diagnosed. Discussed many etiologies of symptoms described but not suspect hypotension to be reason for symptoms. Advised to continue with iron supplementation, advised on adequate hydration and goal to drink at least 64 ounces of water daily. 2. Injury of finger of left hand, initial encounter  No obvious acute injury observed- no bruising, bleeding, lack of function to middle finger. Advised to continue supportive care including ice and analgesics like tylenol or NSAID if needed. Follow-up and Dispositions    · Return in about 2 months (around 8/1/2020), or if symptoms worsen or fail to improve, for well woman exam .         I spent at least 10 minutes on this visit with this established patient. 712  Subjective:   Dewey Brenner is a 30 y.o. female who was seen for No chief complaint on file. Patient seen last week with concern for low BP. She stated she was at physical therapy and therapist was 'very concerned' for low BP. On review of the chart however, it seems BP was appropriate at 108/74.  She does describe feeling symptomatic with feeling tired/not being able to get out of bed, dizzy/faint/lightheaded, shaking, dry mouth. Complicated by known iron deficiency anemia and Lupus (per patient report, awaiting referral, OSIEL negative). Labs recently completed and relatively normal with exception of mild anemia and patient has started taking iron supplements again. CMP normal, TSH normal, no diabetes seen. Vit D not tested with last blood work. Because patient was so distraught over 'low BP' I asked her to begin monitoring at home. She reports BP readings at home as low in the morning, and normal in the afternoon. 5/31/2020- (morning) 112/76  5/31/2020- (afternoon) 122/77    HR in 60s usually. She reports over the weekend window slammed on middle finger on left hand. It hurt initially but pain has subsided. No bruising or swelling. She reports still not having full sensation in tip of middle finger, but has appropriate motor control and function. Has been putting OTC triple antibiotic cream on finger where there is an open wound. Doesn't hurt enough to take medication. HM-   Pap- previously done in [place] and patient reports having these done every 4 months due to precancerous cells. Patient will attempt to have records sent here, in the mean time, will have her follow up for well woman exam soon. Prior to Admission medications    Medication Sig Start Date End Date Taking? Authorizing Provider   ferrous sulfate 325 mg (65 mg iron) tablet Take 1 Tab by mouth Daily (before breakfast). Provider, Historical   nitrofurantoin, macrocrystal-monohydrate, (Macrobid) 100 mg capsule Take 1 Cap by mouth two (2) times a day. 5/21/20   Brennon Escobar NP   predniSONE (STERAPRED) 5 mg dose pack See administration instruction per 5mg dose pack 5/21/20   Brennon Escobar NP   diclofenac EC (VOLTAREN) 50 mg EC tablet Take 1 Tab by mouth two (2) times a day. 5/18/20   Brennon Escobar NP   cyclobenzaprine (FLEXERIL) 10 mg tablet Take 1 Tab by mouth daily as needed for Muscle Spasm(s).  5/18/20   Brennon Escobar NP     No Known Allergies    Patient Active Problem List   Diagnosis Code    Abnormal uterine bleeding (AUB) N93.9    Obesity (BMI 30.0-34. 9) E66.9    Trichomonal vaginitis A59.01     Past Medical History:   Diagnosis Date    Abnormal Papanicolaou smear of cervix     biopsy     Anemia     reports iron deficinecy anemia, not treated presently    [facility] disease [address] [address]     lupus    Cancer ([facility] [address]     pre-cancerous cells seen on LEEP procedure per patient    Hypertension     diagnosed in pregnancy; has not seen provider outside of pregnancy    Preeclampsia     Trichomonal vaginitis 10/13/2017     Past Surgical History:   Procedure Laterality Date    HX  SECTION      HX LEEP PROCEDURE      HX TUBAL LIGATION       Family History   Problem Relation Age of Onset    Diabetes Mother     Arthritis-osteo Mother     Heart Disease Mother     Hypertension Mother     Lupus Mother     Diabetes Maternal Aunt      Social History     Tobacco Use    Smoking status: Never Smoker    Smokeless tobacco: Never Used   Substance Use Topics    Alcohol use: No       Review of Systems   Constitutional: Negative for chills, fever and malaise/fatigue. Eyes: Negative for blurred vision and double vision. Cardiovascular: Negative for chest pain and palpitations. Skin:        cut to left middle finger     Neurological: Positive for sensory change. Negative for dizziness, tingling, tremors, speech change, focal weakness, seizures, weakness and headaches.           Objective:     Visit Vitals  /77   Pulse 60      General: alert, cooperative, no distress   Mental  status: normal mood, behavior, speech, dress, motor activity, and thought processes, able to follow commands   HENT: NCAT   Neck: no visualized mass   Resp: no respiratory distress   Neuro: no gross deficits   Skin: no discoloration or lesions of concern on visible areas   Psychiatric: normal affect, consistent with stated mood, no evidence of hallucinations     Additional exam findings:   No observed bleeding, bruising or swelling to distal area of left middle finger where patient reports injury. Patient with full range of motion of all fingers on left hand observed. We discussed the expected course, resolution and complications of the diagnosis(es) in detail. Medication risks, benefits, costs, interactions, and alternatives were discussed as indicated. I advised her to contact the office if her condition worsens, changes or fails to improve as anticipated. She expressed understanding with the diagnosis(es) and plan. Neelam Acevedo is a 30 y.o. female who was evaluated by a video visit encounter for concerns as above. Patient identification was verified prior to start of the visit. A caregiver was present when appropriate. Due to this being a TeleHealth encounter ([facility]), evaluation of the following organ systems was limited: Vitals/Constitutional/EENT/Resp/CV/GI//MS/Neuro/Skin/Heme-Lymph-Imm. Pursuant to the emergency declaration under the [facility] [place]note 2[facility] waiver authority and the [employer] and [employer] Act, this Virtual  Visit was conducted, with patient's (and/or legal guardian's) consent, to reduce the patient's risk of exposure to COVID-19 and provide necessary medical care. Services were provided through a video synchronous discussion virtually to substitute for in-person clinic visit. Patient and provider were located at their individual homes.       Sebastian Bourne NP

## 2020-06-02 ENCOUNTER — APPOINTMENT (OUTPATIENT)
Dept: PHYSICAL THERAPY | Age: 30
End: 2020-06-02

## 2020-06-26 ENCOUNTER — TELEPHONE (OUTPATIENT)
Dept: FAMILY MEDICINE CLINIC | Age: 30
End: 2020-06-26

## 2020-06-26 DIAGNOSIS — J02.9 PHARYNGITIS, UNSPECIFIED ETIOLOGY: Primary | ICD-10-CM

## 2020-06-26 RX ORDER — AMOXICILLIN 500 MG/1
500 CAPSULE ORAL 2 TIMES DAILY
Qty: 20 CAP | Refills: 0 | Status: SHIPPED | OUTPATIENT
Start: 2020-06-26 | End: 2020-07-06

## 2020-06-26 NOTE — TELEPHONE ENCOUNTER
Called patient back at this time. She reports boyfriend positive and treated for strep, kids have it, and now her throat hurts. Will treat presumptively for strep. Advised she can use warm salt water gargles for pain relief, tylenol/ibuprofen for analgesic if needed. Advised not to kiss, share drinks, change tooth brush. Advised to finish antibiotic.

## 2020-06-26 NOTE — TELEPHONE ENCOUNTER
Patient called to advise that she had a sore throat-no other symptoms but everyone including her boyfriend in the household has strep throat.   Offered patient [facility] information but she declined wants to know if something can be called in

## 2020-06-30 ENCOUNTER — OFFICE VISIT (OUTPATIENT)
Dept: FAMILY MEDICINE CLINIC | Age: 30
End: 2020-06-30

## 2020-06-30 VITALS
OXYGEN SATURATION: 97 % | RESPIRATION RATE: 20 BRPM | HEIGHT: 65 IN | TEMPERATURE: 98.6 F | SYSTOLIC BLOOD PRESSURE: 107 MMHG | DIASTOLIC BLOOD PRESSURE: 56 MMHG | BODY MASS INDEX: 33.82 KG/M2 | HEART RATE: 64 BPM | WEIGHT: 203 LBS

## 2020-06-30 DIAGNOSIS — R07.89 ATYPICAL CHEST PAIN: ICD-10-CM

## 2020-06-30 DIAGNOSIS — M54.41 ACUTE RIGHT-SIDED LOW BACK PAIN WITH RIGHT-SIDED SCIATICA: ICD-10-CM

## 2020-06-30 DIAGNOSIS — F32.1 CURRENT MODERATE EPISODE OF MAJOR DEPRESSIVE DISORDER WITHOUT PRIOR EPISODE (HCC): Primary | ICD-10-CM

## 2020-06-30 DIAGNOSIS — R45.4 DIFFICULTY CONTROLLING ANGER: ICD-10-CM

## 2020-06-30 RX ORDER — DICLOFENAC SODIUM 50 MG/1
50 TABLET, DELAYED RELEASE ORAL 2 TIMES DAILY
Qty: 30 TAB | Refills: 0 | Status: SHIPPED | OUTPATIENT
Start: 2020-06-30

## 2020-06-30 RX ORDER — FLUOXETINE HYDROCHLORIDE 20 MG/1
20 CAPSULE ORAL DAILY
Qty: 30 CAP | Refills: 0 | Status: SHIPPED | OUTPATIENT
Start: 2020-06-30

## 2020-06-30 NOTE — PATIENT INSTRUCTIONS
Recovering From Depression: Care Instructions Your Care Instructions Taking good care of yourself is important as you recover from depression. In time, your symptoms will fade as your treatment takes hold. Do not give up. Instead, focus your energy on getting better. Your mood will improve. It just takes some time. Focus on things that can help you feel better, such as being with friends and family, eating well, and getting enough rest. But take things slowly. Do not do too much too soon. You will begin to feel better gradually. Follow-up care is a key part of your treatment and safety. Be sure to make and go to all appointments, and call your doctor if you are having problems. It's also a good idea to know your test results and keep a list of the medicines you take. How can you care for yourself at home? Be realistic · If you have a large task to do, break it up into smaller steps you can handle, and just do what you can. · You may want to put off important decisions until your depression has lifted. If you have plans that will have a major impact on your life, such as marriage, divorce, or a job change, try to wait a bit. Talk it over with friends and loved ones who can help you look at the overall picture first. 
· Reaching out to people for help is important. Do not isolate yourself. Let your family and friends help you. Find someone you can trust and confide in, and talk to that person. · Be patient, and be kind to yourself. Remember that depression is not your fault and is not something you can overcome with willpower alone. Treatment is necessary for depression, just like for any other illness. Feeling better takes time, and your mood will improve little by little. Stay active · Stay busy and get outside. Take a walk, or try some other light exercise. · Talk with your doctor about an exercise program. Exercise can help with mild depression. · Go to a movie or concert. Take part in a [employer] activity or other social gathering. Go to a ball game. · Ask a friend to have dinner with you. Take care of yourself · Eat a balanced diet with plenty of fresh fruits and vegetables, whole grains, and lean protein. If you have lost your appetite, eat small snacks rather than large meals. · Avoid drinking alcohol or using illegal drugs. Do not take medicines that have not been prescribed for you. They may interfere with medicines you may be taking for depression, or they may make your depression worse. · Take your medicines exactly as they are prescribed. You may start to feel better within 1 to 3 weeks of taking antidepressant medicine. But it can take as many as 6 to 8 weeks to see more improvement. If you have questions or concerns about your medicines, or if you do not notice any improvement by 3 weeks, talk to your doctor. · If you have any side effects from your medicine, tell your doctor. Antidepressants can make you feel tired, dizzy, or nervous. Some people have dry mouth, constipation, headaches, sexual problems, or diarrhea. Many of these side effects are mild and will go away on their own after you have been taking the medicine for a few weeks. Some may last longer. Talk to your doctor if side effects are bothering you too much. You might be able to try a different medicine. · Get enough sleep. If you have problems sleeping: 
? Go to bed at the same time every night, and get up at the same time every morning. ? Keep your bedroom dark and quiet. ? Do not exercise after 5:00 p.m. ? Avoid drinks with caffeine after 5:00 p.m. · Avoid sleeping pills unless they are prescribed by the doctor treating your depression. Sleeping pills may make you groggy during the day, and they may interact with other medicine you are taking.  
· If you have any other illnesses, such as diabetes, heart disease, or high blood pressure, make sure to continue with your treatment. Tell your doctor about all of the medicines you take, including those with or without a prescription. · Keep the numbers for these national suicide hotlines: 5-573-578-TALK (9-481.703.7135) and 5-082-AZQQIVH (4-187.261.7198). If you or someone you know talks about suicide or feeling hopeless, get help right away. When should you call for help? [id number] anytime you think you may need emergency care. For example, call if: 
· You feel like hurting yourself or someone else. · Someone you know has depression and is about to attempt or is attempting suicide. Call your doctor now or seek immediate medical care if: 
· You hear voices. · Someone you know has depression and: 
? Starts to give away his or her possessions. ? Uses illegal drugs or drinks alcohol heavily. ? Talks or writes about death, including writing suicide notes or talking about guns, knives, or pills. ? Starts to spend a lot of time alone. ? Acts very aggressively or suddenly appears calm. Watch closely for changes in your health, and be sure to contact your doctor if: 
· You do not get better as expected. Where can you learn more? Go to http://theresa-stacy.info/ Enter J273 in the search box to learn more about \"Recovering From Depression: Care Instructions. \" Current as of: January 31, 2020               Content Version: 12.5 © 2074-8738 Healthwise, Incorporated. Care instructions adapted under license by [facility] (which disclaims liability or warranty for this information). If you have questions about a medical condition or this instruction, always ask your healthcare professional. [place] [address] any warranty or liability for your use of this information. Learning About Managing Anger What causes anger? Many things can cause anger: Stress at work or at home.  Social situations that make you angry. A response to everyday events. Anger signals your body to prepare for a fight. This reaction is often called \"fight or flight. \" When you get angry, adrenaline and other hormones are released into your blood. Then your blood pressure goes up, your heart beats faster, and you breathe faster. When you express anger in a healthy way, it can inspire change and make you productive. But if you don't have the skills to express anger in a healthy way, anger can build up. You may hurt othersand yourselfemotionally and even physically. Violent behavior often starts with verbal threats or fairly minor incidents. But over time, it can involve physical harm. It can include physical, verbal, or sexual abuse of an intimate partner (domestic violence), a child (child abuse), or an older adult (elder abuse). It can also make you sick. Anger and constant hostility keep your blood pressure high. They increase your chances of having another health problem, such as depression, a heart attack, or a stroke. Some people with post-traumatic stress disorder (PTSD) feel angry and on alert all the time. It may feel like there are no other ways to react when you are angry. But when you learn to work with anger in appropriate and healthy ways, your anger no longer controls you. How can you manage your anger? The first step to managing anger is to be more aware of it. Note the thoughts, feelings, and emotions that you have when you get angry. Practice noticing these signs of anger when you are calm. If you are more aware of the signs of anger, you can take steps to manage it. Here are a few tips: 
· Think before you act. Take time to stop and cool down when you feel yourself getting angry. Count to 10 while you take slow, steady breaths. Practice some other form of mental relaxation. · Learn the feelings that lead to angry outbursts.  Anger and hostility may be a symptom of unhappy feelings or depression about your job, your relationship, or other aspects of your personal life. · Avoid situations that trigger your anger. If standing in line bothers you, do errands at less busy times. · Express anger in a healthy way. You might: 
? Go for a short walk or jog. 
? Draw, paint, or listen to music to release the anger. ? Write in a daily journal. 
? Use \"I\" statements, not \"you\" statements, to discuss your anger. Say \"I don't feel valued when my needs are not being met\" instead of \"You make me mad when you are so inconsiderate. \" · Take care of yourself. ? Exercise regularly. ? Eat a balanced diet. Don't skip meals. ? Try to get 8 hours of sleep each night. ? Limit your use of alcohol, and don't use illegal drugs. ? Practice yoga, meditation, or elvis chi to relax. · Explore other resources that may be available through your job or your community. ? Contact your human resources department at work. You might be able to get services through an employee assistance program. 
? Contact your local hospital, mental health facility, or health department. Ask what types of programs or support groups are available in your area. · Do not keep guns in your home. If you must have guns in your home, unload them and lock them up. Lock ammunition in a separate place. Keep guns away from children. Where can you find help? If anger or stress starts to harm your work or personal relationships, you might seek help. You can learn ways to control your feelings and actions. · Talk to someone you trust, or find a counselor. · There are groups in your area that can connect you with people to talk to. ? [address] . This service from the national Substance Abuse and [address] can help you find local counselors. Search online at [employer]. samhsa.gov or call 3-660-977-HELP (370 659 958), or [employer] 5-343.907.2633. ? Parents Anonymous. Self-help groups that serve parents under stress, as well as children who have been abused, are available throughout the [place], [place] ([facility]), and [facility]. To find a group in your area, search online or in your phone book under Parents Anonymous or call (659) 227-1168. Where can you learn more? Go to http://theresaCOGEONstacy.info/ Enter 603 9698 in the search box to learn more about \"Learning About Managing Anger. \" Current as of: December 16, 2019               Content Version: 12.5 © 1253-1867 [employer]. Care instructions adapted under license by [employer] (which disclaims liability or warranty for this information). If you have questions about a medical condition or this instruction, always ask your healthcare professional. [address] any warranty or liability for your use of this information. Local Counseling/Mental Health Resources Suicidal ideation? National suicide hotline: 5-297.940.6388 Plan of suicide? ER or 911 immediately [facility] [address], [place], [address]  Phone: [phone] [address] [place], [place] Phone: [phone] [place] [facility] Roque Salcido [phone] 4 [address] [address] [address] Phone [phone] [address] [place] [address] Doctor Lopez [address], [place], [address] Phone: [phone] [address] [place] [address] Phone: [phone] [address] [address] Board (walk in, emergency services, counseling, med management) [address] [facility] [place], [address]  
[phone] [place], [address]  
[phone] [facility] [address] [address] Phone: [phone] 4 [facility] ([address]) [address] [facility] Agustín Husbands, [address] [address] Phone: [phone] [facility] [place], [place] Phone: [phone] [facility] [place] [facility] Jaden [address] [facility] Phone: [phone] [place] [address] [facility], [address] [address] Phone: [phone] [address] V[address]ius Ring also has [place] location [address] [facility], [address] [place], [place] Phone: [phone] [address], [place], [address] [facility] Phone: [phone] [facility] [address] [facility], [address] [facility] [address] Phone: [phone] Prediabetes: Care Instructions Overview Prediabetes is a warning sign that you're at risk for getting type 2 diabetes. It means that your blood sugar is higher than it should be. But it's not high enough to be diabetes. The food you eat naturally turns into sugar. Your body uses the sugar for energy. Normally, an organ called the pancreas makes insulin. And insulin allows the sugar in your blood to get into your body's cells. But sometimes the body can't use insulin the right way. So the sugar stays in your blood instead. This is called insulin resistance. The buildup of sugar in your blood means you have prediabetes. The good news is that you may be able to prevent or delay diabetes. Making small lifestyle changes, like getting active and changing your eating habits, may help you get your blood sugar back to normal. You can work with your doctor to make a treatment plan. Follow-up care is a key part of your treatment and safety. Be sure to make and go to all appointments, and call your doctor if you are having problems. It's also a good idea to know your test results and keep a list of the medicines you take. How can you care for yourself at home? · Watch your weight. A healthy weight helps your body use insulin properly. · Limit the amount of calories, sweets, and unhealthy fat you eat. Ask your doctor if you should see a dietitian. A registered dietitian can help you create meal plans that fit your lifestyle. · Get at least 30 minutes of exercise on most days of the week. Exercise helps control your blood sugar. It also helps you maintain a healthy weight. Walking is a good choice. You also may want to do other activities, such as running, swimming, cycling, or playing tennis or team sports. · Do not smoke. Smoking can make prediabetes worse. If you need help quitting, talk to your doctor about stop-smoking programs and medicines. These can increase your chances of quitting for good. · If your doctor prescribed medicines, take them exactly as prescribed. Call your doctor if you think you are having a problem with your medicine. You will get more details on the specific medicines your doctor prescribes. When should you call for help? Watch closely for changes in your health, and be sure to contact your doctor if: 
· You have any symptoms of diabetes. These may include: 
? Being thirsty more often. ? Urinating more. ? Being hungrier. ? Losing weight. ? Being very tired. ? Having blurry vision. · You have a wound that will not heal. 
· You have an infection that will not go away. · You have problems with your blood pressure. · You want more information about diabetes and how you can keep from getting it. Where can you learn more? Go to http://theresa-stacy.info/ Enter I222 in the search box to learn more about \"Prediabetes: Care Instructions. \" Current as of: December 20, 2019               Content Version: 12.5 © 0356-0638 Healthwise, Incorporated. Care instructions adapted under license by [employer] (which disclaims liability or warranty for this information).  If you have questions about a medical condition or this instruction, always ask your healthcare professional. [address] any warranty or liability for your use of this information. Chest Pain: Care Instructions Your Care Instructions There are many things that can cause chest pain. Some are not serious and will get better on their own in a few days. But some kinds of chest pain need more testing and treatment. Your doctor may have recommended a follow-up visit in the next 8 to 12 hours. If you are not getting better, you may need more tests or treatment. Even though your doctor has released you, you still need to watch for any problems. The doctor carefully checked you, but sometimes problems can develop later. If you have new symptoms or if your symptoms do not get better, get medical care right away. If you have worse or different chest pain or pressure that lasts more than 5 minutes or you passed out (lost consciousness), [id number] or seek other emergency help right away. A medical visit is only one step in your treatment. Even if you feel better, you still need to do what your doctor recommends, such as going to all suggested follow-up appointments and taking medicines exactly as directed. This will help you recover and help prevent future problems. How can you care for yourself at home? · Rest until you feel better. · Take your medicine exactly as prescribed. Call your doctor if you think you are having a problem with your medicine. · Do not drive after taking a prescription pain medicine. When should you call for help? [id number]:  
· You passed out (lost consciousness). · You have severe difficulty breathing. · You have symptoms of a heart attack. These may include: 
? Chest pain or pressure, or a strange feeling in your chest. 
? Sweating. ? Shortness of breath. ? Nausea or vomiting.  
? Pain, pressure, or a strange feeling in your back, neck, jaw, or upper belly or in one or both shoulders or arms. ? Lightheadedness or sudden weakness. ? A fast or irregular heartbeat. After you call 911, the  may tell you to chew 1 adult-strength or 2 to 4 low-dose aspirin. Wait for an ambulance. Do not try to drive yourself. Call your doctor today if:  
· You have any trouble breathing. · Your chest pain gets worse. · You are dizzy or lightheaded, or you feel like you may faint. · You are not getting better as expected. · You are having new or different chest pain. Where can you learn more? Go to http://theresa-stacy.info/ Enter A120 in the search box to learn more about \"Chest Pain: Care Instructions. \" Current as of: June 26, 2019               Content Version: 12.5 © 1356-9797 Healthwise, Incorporated. Care instructions adapted under license by [facility] (which disclaims liability or warranty for this information). If you have questions about a medical condition or this instruction, always ask your healthcare professional. [address] any warranty or liability for your use of this information.

## 2020-06-30 NOTE — PROGRESS NOTES
Isabel Cervantes presents today for   Chief Complaint   Patient presents with    Follow Up Chronic Condition       Is someone accompanying this pt? no    Is the patient using any DME equipment during OV? no    Depression Screening:  3 most recent PHQ Screens 6/30/2020   Little interest or pleasure in doing things Not at all   Feeling down, depressed, irritable, or hopeless Not at all   Total Score PHQ 2 0   Trouble falling or staying asleep, or sleeping too much Several days   Feeling tired or having little energy Several days   Poor appetite, weight loss, or overeating Not at all   Feeling bad about yourself - or that you are a failure or have let yourself or your family down Not at all   Trouble concentrating on things such as school, work, reading, or watching TV Not at all   Moving or speaking so slowly that other people could have noticed; or the opposite being so fidgety that others notice Not at all   Thoughts of being better off dead, or hurting yourself in some way Not at all   PHQ 9 Score 2   How difficult have these problems made it for you to do your work, take care of your home and get along with others Not difficult at all       Learning Assessment:  No flowsheet data found. Abuse Screening:  No flowsheet data found. Fall Risk  No flowsheet data found. Health Maintenance reviewed and discussed and ordered per Provider. Health Maintenance Due   Topic Date Due    DTaP/Tdap/Td series (1 - Tdap) 10/30/2012    PAP AKA CERVICAL CYTOLOGY  10/30/2012   . Coordination of Care:  1. Have you been to the ER, urgent care clinic since your last visit? Hospitalized since your last visit? no    2. Have you seen or consulted any other health care providers outside of the [address] [place] [address] since your last visit? Include any pap smears or colon screening.  no

## 2020-06-30 NOTE — PROGRESS NOTES
OFFICE NOTE (SOAP)      2020  11:50 AM    Chief Complaint   Patient presents with    Follow Up Chronic Condition       SUBJECTIVE:    HPI:   Lisette Llanos is a 30 y.o. female presenting today for office visit. Patient seen virtually a few times, she is seeing orthopedics and PT for back pain. She has been referred to rheumatology for work up for lupus- OSIEL negative, but she has been told she has lupus by another provider before. Today with concerns for anger and depression. Depression/Anger- she states this has been going on since mom passed away in 2017, she feels like in this past year she has not been coping well. Mostly she feels anger towards other adults, feels impulsive and has difficulty controlling impulses of anger. She reports feeling little interest or pleasure in doing things that used to bring her toan. She reports feeling like she is not getting enough sleep and feeling drained/fatigued during the day. She has a poor appetite too. she denies SI/HI. She is hoping to start medication to help with feeling depressed and to help with anger. She has never sought treatment for this previously. She has never seen counselor. She is open to it but admits she struggles with trusting other adults. Chest pain- this happened nearly everyday overthe past 2 weeks. Not at all this week. When it comes it happens 6-7x per day, even waking up out of sleep. It last 3-4 minutes. Sitting still, holding breath, and rubbing area helps with this. Family history of heart disease- mom apparently had ultrasound of hte heart which was 'bad' and she  shortly after. She does not know specifics. She is concerned for family history. I tried exploring non-cardiac etiologies of chest pain today- GERD, gas- she states she knows it is not either of these things. Not interested in working up those possibilities. Discussed stress/anxiety could be contributing and she agrees.  She is wanting to trial prozac and follow closely. Warned patient for ER visit if chest pain returns, not getting better, longer than 5 minutes, or any syncope, numbness, tingling, weakness, shortness of breath with it. Review of Systems   Constitutional: Positive for fatigue. Negative for activity change, appetite change, chills, diaphoresis, fever and unexpected weight change. HENT: Negative for congestion, sinus pressure, sinus pain, sore throat and trouble swallowing. Respiratory: Negative for chest tightness. Cardiovascular: Negative for chest pain, palpitations and leg swelling. Gastrointestinal: Negative for abdominal pain. Musculoskeletal: Positive for arthralgias and back pain. Negative for joint swelling and myalgias. Psychiatric/Behavioral: Positive for agitation, dysphoric mood and sleep disturbance. Negative for behavioral problems, confusion, decreased concentration, hallucinations, self-injury and suicidal ideas. The patient is nervous/anxious. The patient is not hyperactive.           PHQ Screening   3 most recent PHQ Screens 6/30/2020   Little interest or pleasure in doing things More than half the days   Feeling down, depressed, irritable, or hopeless Several days   Total Score PHQ 2 3   Trouble falling or staying asleep, or sleeping too much Several days   Feeling tired or having little energy Several days   Poor appetite, weight loss, or overeating Not at all   Feeling bad about yourself - or that you are a failure or have let yourself or your family down Not at all   Trouble concentrating on things such as school, work, reading, or watching TV Not at all   Moving or speaking so slowly that other people could have noticed; or the opposite being so fidgety that others notice Not at all   Thoughts of being better off dead, or hurting yourself in some way Not at all   PHQ 9 Score 5   How difficult have these problems made it for you to do your work, take care of your home and get along with others Not difficult at all History  Past Medical History:   Diagnosis Date    Abnormal Papanicolaou smear of cervix     biopsy     Anemia     reports iron deficinecy anemia, not treated presently    Autoimmune disease ([facility] [address] 2003    lupus    Cancer ([facility] [address]     pre-cancerous cells seen on LEEP procedure per patient    Hypertension     diagnosed in pregnancy; has not seen provider outside of pregnancy    Preeclampsia     Trichomonal vaginitis 10/13/2017       Past Surgical History:   Procedure Laterality Date    HX  SECTION      HX LEEP PROCEDURE      HX TUBAL LIGATION         Social History     Socioeconomic History    Marital status: SINGLE     Spouse name: Not on file    Number of children: Not on file    Years of education: Not on file    Highest education level: Not on file   Occupational History    Not on file   Social Needs    Financial resource strain: Not on file    Food insecurity     Worry: Not on file     Inability: Not on file    Transportation needs     Medical: Not on file     Non-medical: Not on file   Tobacco Use    Smoking status: Never Smoker    Smokeless tobacco: Never Used   Substance and Sexual Activity    Alcohol use: No    Drug use: Not Currently     Types: Marijuana     Comment: occasionally    Sexual activity: Not Currently     Partners: Male     Birth control/protection: Surgical   Lifestyle    Physical activity     Days per week: Not on file     Minutes per session: Not on file    Stress: Not on file   Relationships    Social connections     Talks on phone: Not on file     Gets together: Not on file     Attends Shinto service: Not on file     Active member of club or organization: Not on file     Attends meetings of clubs or organizations: Not on file     Relationship status: Not on file    Intimate partner violence     Fear of current or ex partner: Not on file     Emotionally abused: Not on file     Physically abused: Not on file     Forced sexual activity: Not on file   Other Topics Concern     Service Not Asked    Blood Transfusions No    Caffeine Concern Not Asked    Occupational Exposure Not Asked    Hobby Hazards Not Asked    Sleep Concern Not Asked    Stress Concern Not Asked    Weight Concern Not Asked    Special Diet Not Asked    Back Care Not Asked    Exercise Not Asked    Bike Helmet Not Asked   [address] Not Asked    Self-Exams Not Asked   Social History Narrative    Not on file       Family History   Problem Relation Age of Onset    Diabetes Mother     Arthritis-osteo Mother     Heart Disease Mother     Hypertension Mother     Lupus Mother     Diabetes Maternal Aunt        No Known Allergies    Current Outpatient Medications   Medication Sig Dispense Refill    amoxicillin (AMOXIL) 500 mg capsule Take 1 Cap by mouth two (2) times a day for 10 days. 20 Cap 0    ferrous sulfate 325 mg (65 mg iron) tablet Take 1 Tab by mouth Daily (before breakfast).  nitrofurantoin, macrocrystal-monohydrate, (Macrobid) 100 mg capsule Take 1 Cap by mouth two (2) times a day. 10 Cap 0    predniSONE (STERAPRED) 5 mg dose pack See administration instruction per 5mg dose pack 21 Tab 0    diclofenac EC (VOLTAREN) 50 mg EC tablet Take 1 Tab by mouth two (2) times a day. 30 Tab 0    cyclobenzaprine (FLEXERIL) 10 mg tablet Take 1 Tab by mouth daily as needed for Muscle Spasm(s).  30 Tab 0       Patient Care Team:  Patient Care Team:  Nayely Rob NP as PCP - General (Nurse Practitioner)  Nayely Rob NP as PCP [facility]      LABS:  Lab Results   Component Value Date/Time    WBC 7.2 05/18/2020 02:27 PM    HGB 11.9 (L) 05/18/2020 02:27 PM    HCT 35.2 05/18/2020 02:27 PM    PLATELET 206 74/07/2345 02:27 PM    MCV 85.2 05/18/2020 02:27 PM     Lab Results   Component Value Date/Time    Sodium 142 05/18/2020 02:27 PM    Potassium 3.8 05/18/2020 02:27 PM    Chloride 112 (H) 05/18/2020 02:27 PM    CO2 25 05/18/2020 02:27 PM Anion gap 5 05/18/2020 02:27 PM    Glucose 96 05/18/2020 02:27 PM    BUN 11 05/18/2020 02:27 PM    Creatinine 0.74 05/18/2020 02:27 PM    BUN/Creatinine ratio 15 05/18/2020 02:27 PM    GFR est AA >60 05/18/2020 02:27 PM    GFR est non-AA >60 05/18/2020 02:27 PM    Calcium 9.2 05/18/2020 02:27 PM    Bilirubin, total 0.3 05/18/2020 02:27 PM    Alk. phosphatase 82 05/18/2020 02:27 PM    Protein, total 7.7 05/18/2020 02:27 PM    Albumin 4.0 05/18/2020 02:27 PM    Globulin 3.7 05/18/2020 02:27 PM    A-G Ratio 1.1 05/18/2020 02:27 PM    ALT (SGPT) 14 05/18/2020 02:27 PM    AST (SGOT) 12 05/18/2020 02:27 PM     Lab Results   Component Value Date/Time    TSH 0.86 05/18/2020 02:27 PM     Lab Results   Component Value Date/Time    Hemoglobin A1c 5.9 (H) 05/18/2020 02:27 PM         RADIOLOGY:  None new to review        OBJECTIVE:      Physical Exam  Vitals signs and nursing note reviewed. Constitutional:       Appearance: Normal appearance. She is well-developed. She is obese. HENT:      Head: Normocephalic and atraumatic. Neck:      Musculoskeletal: Normal range of motion. Cardiovascular:      Rate and Rhythm: Normal rate and regular rhythm. Pulses: Normal pulses. Heart sounds: No murmur. Pulmonary:      Effort: Pulmonary effort is normal.      Breath sounds: Normal breath sounds. No wheezing or rhonchi. Chest:      Chest wall: No tenderness. Musculoskeletal: Normal range of motion. Right lower leg: No edema. Left lower leg: No edema. Skin:     General: Skin is warm and dry. Coloration: Skin is not pale. Findings: No erythema or rash. Neurological:      Mental Status: She is alert and oriented to person, place, and time.    Psychiatric:         Mood and Affect: Mood normal.         Behavior: Behavior normal.           Vitals:    06/30/20 1125   BP: 107/56   Pulse: 64   Resp: 20   Temp: 98.6 °F (37 °C)   TempSrc: Oral   SpO2: 97%   Weight: 203 lb (92.1 kg)   Height: 5' 5\" (1.651 m)   PainSc:   0 - No pain   LMP: 06/21/2020         Assessment & Plan:    1. Acute right-sided low back pain with right-sided sciatica  Refilled   - diclofenac EC (VOLTAREN) 50 mg EC tablet; Take 1 Tab by mouth two (2) times a day. Dispense: 30 Tab; Refill: 0    2. Current moderate episode of major depressive disorder without prior episode (HCC)  - FLUoxetine (PROzac) 20 mg capsule; Take 1 Cap by mouth daily. Dispense: 30 Cap; Refill: 0    3. Difficulty controlling anger  Also encouraged to start counseling. List of local resources given. - FLUoxetine (PROzac) 20 mg capsule; Take 1 Cap by mouth daily. Dispense: 30 Cap; Refill: 0    4. Atypical chest pain  Discussed warning signs, when to seek ER care. Discussed keeping track of symptoms. Will treat anxiety/stress with proxac and see if relief        Orders Placed This Encounter    diclofenac EC (VOLTAREN) 50 mg EC tablet     Sig: Take 1 Tab by mouth two (2) times a day. Dispense:  30 Tab     Refill:  0    FLUoxetine (PROzac) 20 mg capsule     Sig: Take 1 Cap by mouth daily. Dispense:  30 Cap     Refill:  0            Follow-up and Dispositions    · Return in about 4 weeks (around 7/28/2020), or if symptoms worsen or fail to improve, for virtual visit - depression follow up. Plan of care reviewed with patient. Patient in agreement with plan and expresses understanding. I have discussed when to anticipate results and how results will be communicated, if applicable. Anticipatory guidance given and questions answered, patient encouraged to call or RTO if further questions or concerns.     Sebastian Bourne NP  06/30/20

## 2020-07-15 NOTE — PROGRESS NOTES
[facility]              [address]        [place], [address]   [phone] fax    Discharge Summary  Patient name: Felicia Duggan Start of Care: 2020   Referral source: Michell Raza MD : 1990   Medical/Treatment Diagnosis: Low back pain [M54.5]  Payor: [place] [id number] / Plan: [address] / Product Type: Managed Care Medicaid /  Onset Date:2020     Prior Hospitalization: see medical history Provider#: [id number]   Medications: Verified on Patient Summary List    Comorbidities: Diabetes Type I, HTN   Prior Level of Function: (I) Functional ADLs, (I) Self-Care ADLs, Work Part-Time, Mother (children x8)  Visits from Start of Care: 1    Missed Visits: 1  Reporting Period : 2020 to 2020    Summary of Care:    Short Term Goals: To be accomplished in 3 weeks:  1. Patient will subjectively report full compliance with prescribed HEP. Eval: HEP provided  At DC: Inability to assess secondary to patient non-attendance  2. Patient will demonstrate lumbar extension AROM </= 50% limited with pain < 5/10 to improve overhead lifting. Eval: Lumbar Extension AROM = 90% limited (lumbar pain 10/10)  At DC: Inability to assess secondary to patient non-attendance  3. Patient will demonstrate left/right lumbar sidebend AROM <25% limited to improve ease with household ADLs. Eval: Left Lumbar Sidebend 50% limited, Right Lumbar Sidebend 50% limited  At DC: Inability to assess secondary to patient non-attendance     Long Term Goals: To be accomplished in 6 weeks:  1. Patient will demonstrate a significant functional improvement as demonstrated by a score of >/= 69 on FOTO. Eval: FOTO = 51       At DC: Inability to assess secondary to patient non-attendance  2. Patient will subjectively report >/= 60% improvement in sleep disturbances to promote improvement in overall quality of life. Eval: 0%  At DC:  Inability to assess secondary to patient non-attendance  3. Patient will report pain at worse over the last week 5/10 to improve ease with work-related ADLs. Eval: Pain at Worst = 9/10  At DC: Inability to assess secondary to patient non-attendance    ASSESSMENT/RECOMMENDATIONS:    At this time patient to be discharged in accordance with clinic 30 day policy with patient having not been seen within clinic since initial evaluation 5/28/2020. Patient having no-showed only scheduled within clinic treatment session post-evaluation with inability to get in touch with patient to schedule further appointments. [x]Discontinue therapy: []Patient has reached or is progressing toward set goals      [x]Patient is non-compliant or has abdicated      []Due to lack of appreciable progress towards set goals    Jerilyn Sosa, PT 7/15/2020 4:08 PM    NOTE TO PHYSICIAN:  Please complete the following and fax to: [facility] at [facility] at [phone]  . Retain this original for your records. If you are unable to process this request in   24 hours, please contact our office.      [] I have read the above report and request that my patient continue therapy with the following changes/special instructions:  [] I have read the above report and request that my patient be discharged from therapy    Physician's Signature:____________Date:_________TIME:________    ** Signature, Date and Time must be completed for valid certification **

## 2020-09-01 ENCOUNTER — OFFICE VISIT (OUTPATIENT)
Dept: FAMILY MEDICINE CLINIC | Age: 30
End: 2020-09-01

## 2020-09-01 ENCOUNTER — TELEPHONE (OUTPATIENT)
Dept: FAMILY MEDICINE CLINIC | Age: 30
End: 2020-09-01

## 2020-09-01 VITALS
SYSTOLIC BLOOD PRESSURE: 114 MMHG | TEMPERATURE: 98.3 F | HEIGHT: 65 IN | DIASTOLIC BLOOD PRESSURE: 71 MMHG | WEIGHT: 198.6 LBS | HEART RATE: 60 BPM | OXYGEN SATURATION: 100 % | RESPIRATION RATE: 16 BRPM | BODY MASS INDEX: 33.09 KG/M2

## 2020-09-01 DIAGNOSIS — S82.151A CLOSED DISPLACED FRACTURE OF RIGHT TIBIAL TUBEROSITY, INITIAL ENCOUNTER: Primary | ICD-10-CM

## 2020-09-01 RX ORDER — HYDROCODONE BITARTRATE AND ACETAMINOPHEN 5; 325 MG/1; MG/1
1 TABLET ORAL
Qty: 12 TAB | Refills: 0 | Status: SHIPPED | OUTPATIENT
Start: 2020-09-01 | End: 2020-09-04

## 2020-09-01 RX ORDER — HYDROCODONE BITARTRATE AND ACETAMINOPHEN 5; 325 MG/1; MG/1
TABLET ORAL
COMMUNITY
Start: 2020-08-28 | End: 2020-09-01 | Stop reason: SDUPTHER

## 2020-09-01 NOTE — TELEPHONE ENCOUNTER
Patient called in on 8- to advise she was on her way here to the office because she had been seen in the ER in [place] due to a knee injury. Explained to patient that due to her provider being out of the office and Dr. Cammie Chi did not have any openings, gave her the name and number of office at [address] [place] [address] refused to go back to ER or Urgent Care.

## 2020-09-01 NOTE — PROGRESS NOTES
Chief Complaint   Patient presents with    Follow-up     ER right knee pain      1. Have you been to the ER, urgent care clinic since your last visit? Hospitalized since your last visit? Yes When: 20 Where: [facility]  Reason for visit: knee pain  . 2. Have you seen or consulted any other health care providers outside of the [address] [place] [address] since your last visit? Include any pap smears or colon screening. No     HPI  Miguel Shelley is seen for follow-up care. Knee pain: Patient fell from height while in [place] and landed on her right knee. Sustained injury to the knee. She was seen in the emergency room and had an x-ray done that was reported as: Impression: There is a displaced fracture of the posterior aspect of the tibial plateau. The joint spaces are well-preserved. Suprapatellar effusion. The soft tissues are unremarkable. Since then she has pain in the knee. She was given some Norco to take for pain as needed. She has completed this medication and continues to have pain. She is unable to flex and extend the knee and it has been put in a splint. She was advised to get surgery but she prefers to be seen by a provider in [place]. She comes in today for follow-up care. I will place a referral for to be seen by the orthopedist urgently. I will give her some Norco to take every 6 hourly as needed severe pain. Will only give 12 tabs.     Past Medical History  Past Medical History:   Diagnosis Date    Abnormal Papanicolaou smear of cervix     biopsy     Anemia     reports iron deficinecy anemia, not treated presently    Autoimmune disease ([address] 2003    lupus    Cancer [facility]     pre-cancerous cells seen on LEEP procedure per patient    Hypertension     diagnosed in pregnancy; has not seen provider outside of pregnancy    Preeclampsia     Trichomonal vaginitis 10/13/2017       Surgical History  Past Surgical History:   Procedure Laterality Date    HX  SECTION    HX LEEP PROCEDURE  2015    HX TUBAL LIGATION  2015        Medications  Current Outpatient Medications   Medication Sig Dispense Refill    HYDROcodone-acetaminophen (NORCO) 5-325 mg per tablet Take 1 Tab by mouth every six (6) hours as needed for Pain for up to 3 days. Max Daily Amount: 4 Tabs. 12 Tab 0    FLUoxetine (PROzac) 20 mg capsule Take 1 Cap by mouth daily. 30 Cap 0    ferrous sulfate 325 mg (65 mg iron) tablet Take 1 Tab by mouth Daily (before breakfast).  diclofenac EC (VOLTAREN) 50 mg EC tablet Take 1 Tab by mouth two (2) times a day. 30 Tab 0    cyclobenzaprine (FLEXERIL) 10 mg tablet Take 1 Tab by mouth daily as needed for Muscle Spasm(s).  27 Tab 0       Allergies  No Known Allergies    Family History  Family History   Problem Relation Age of Onset    Diabetes Mother     Arthritis-osteo Mother     Heart Disease Mother     Hypertension Mother     Lupus Mother     Diabetes Maternal Aunt        Social History  Social History     Socioeconomic History    Marital status: SINGLE     Spouse name: Not on file    Number of children: Not on file    Years of education: Not on file    Highest education level: Not on file   Occupational History    Not on file   Social Needs    Financial resource strain: Not on file    Food insecurity     Worry: Not on file     Inability: Not on file    Transportation needs     Medical: Not on file     Non-medical: Not on file   Tobacco Use    Smoking status: Never Smoker    Smokeless tobacco: Never Used   Substance and Sexual Activity    Alcohol use: No    Drug use: Not Currently     Types: Marijuana     Comment: occasionally    Sexual activity: Not Currently     Partners: Male     Birth control/protection: Surgical   Lifestyle    Physical activity     Days per week: Not on file     Minutes per session: Not on file    Stress: Not on file   Relationships    Social connections     Talks on phone: Not on file     Gets together: Not on file Attends Faith service: Not on file     Active member of club or organization: Not on file     Attends meetings of clubs or organizations: Not on file     Relationship status: Not on file    Intimate partner violence     Fear of current or ex partner: Not on file     Emotionally abused: Not on file     Physically abused: Not on file     Forced sexual activity: Not on file   Other Topics Concern     Service Not Asked    Blood Transfusions No    Caffeine Concern Not Asked    Occupational Exposure Not Asked    Hobby Hazards Not Asked    Sleep Concern Not Asked    Stress Concern Not Asked    Weight Concern Not Asked    Special Diet Not Asked    Back Care Not Asked    Exercise Not Asked    Bike Helmet Not Asked   [place] Not Asked    Self-Exams Not Asked   Social History Narrative    Not on file       Review of Systems  Review of Systems - Review of all systems is negative except as noted above in the HPI. Vital Signs  Visit Vitals  /71 (BP 1 Location: Left arm, BP Patient Position: Sitting)   Pulse 60   Temp 98.3 °F (36.8 °C) (Oral)   Resp 16   Ht 5' 5\" (1.651 m)   Wt 198 lb 9.6 oz (90.1 kg)   LMP 08/06/2020 (Approximate)   SpO2 100%   BMI 33.05 kg/m²         Physical Exam  Physical Examination: General appearance - alert, well appearing, and in no distress, oriented to person, place, and time and acyanotic, in no respiratory distress  Mental status - alert, oriented to person, place, and time, affect appropriate to mood  Chest - clear to auscultation, no wheezes, rales or rhonchi, symmetric air entry  Heart - S1 and S2 normal  Neurological - neck supple without rigidity  Musculoskeletal -right knee swelling slightly and has discomfort along the margins to palpation. Patient has laceration superior and medial aspect of the knee that is not bleeding. She is unable to flex and extend the knee due to pain.   Extremities - no pedal edema noted, intact peripheral pulses      Results  Results for orders placed or performed during the hospital encounter of 83/19/95   METABOLIC PANEL, COMPREHENSIVE   Result Value Ref Range    Sodium 142 136 - 145 mmol/L    Potassium 3.8 3.5 - 5.5 mmol/L    Chloride 112 (H) 100 - 111 mmol/L    CO2 25 21 - 32 mmol/L    Anion gap 5 3.0 - 18 mmol/L    Glucose 96 74 - 99 mg/dL    BUN 11 7.0 - 18 MG/DL    Creatinine 0.74 0.6 - 1.3 MG/DL    BUN/Creatinine ratio 15 12 - 20      GFR est AA >60 >60 ml/min/1.73m2    GFR est non-AA >60 >60 ml/min/1.73m2    Calcium 9.2 8.5 - 10.1 MG/DL    Bilirubin, total 0.3 0.2 - 1.0 MG/DL    ALT (SGPT) 14 13 - 56 U/L    AST (SGOT) 12 10 - 38 U/L    Alk. phosphatase 82 45 - 117 U/L    Protein, total 7.7 6.4 - 8.2 g/dL    Albumin 4.0 3.4 - 5.0 g/dL    Globulin 3.7 2.0 - 4.0 g/dL    A-G Ratio 1.1 0.8 - 1.7     CBC WITH AUTOMATED DIFF   Result Value Ref Range    WBC 7.2 4.6 - 13.2 K/uL    RBC 4.13 (L) 4.20 - 5.30 M/uL    HGB 11.9 (L) 12.0 - 16.0 g/dL    HCT 35.2 35.0 - 45.0 %    MCV 85.2 74.0 - 97.0 FL    MCH 28.8 24.0 - 34.0 PG    MCHC 33.8 31.0 - 37.0 g/dL    RDW 14.6 (H) 11.6 - 14.5 %    PLATELET 210 010 - 884 K/uL    MPV 13.7 (H) 9.2 - 11.8 FL    NEUTROPHILS 54 40 - 73 %    LYMPHOCYTES 39 21 - 52 %    MONOCYTES 7 3 - 10 %    EOSINOPHILS 0 0 - 5 %    BASOPHILS 0 0 - 2 %    ABS. NEUTROPHILS 3.9 1.8 - 8.0 K/UL    ABS. LYMPHOCYTES 2.8 0.9 - 3.6 K/UL    ABS. MONOCYTES 0.5 0.05 - 1.2 K/UL    ABS. EOSINOPHILS 0.0 0.0 - 0.4 K/UL    ABS.  BASOPHILS 0.0 0.0 - 0.1 K/UL    DF AUTOMATED     IRON PROFILE   Result Value Ref Range    Iron 41 (L) 50 - 175 ug/dL    TIBC 389 250 - 450 ug/dL    Iron % saturation 11 (L) 20 - 50 %   FERRITIN   Result Value Ref Range    Ferritin 37 8 - 388 NG/ML   HEMOGLOBIN A1C WITH EAG   Result Value Ref Range    Hemoglobin A1c 5.9 (H) 4.2 - 5.6 %    Est. average glucose 123 mg/dL   TSH 3RD GENERATION   Result Value Ref Range    TSH 0.86 0.36 - 3.74 uIU/mL   OSIEL COMPREHENSIVE PANEL   Result Value Ref Range Anti-DNA (DS) Ab, QT 1 0 - 9 IU/mL    RNP Abs 0.2 0.0 - 0.9 AI    Grace Abs <0.2 0.0 - 0.9 AI    Scleroderma-70 Ab <0.2 0.0 - 0.9 AI    Sjogren's Anti-SS-A <0.2 0.0 - 0.9 AI    Sjogren's Anti-SS-B <0.2 0.0 - 0.9 AI    Antichromatin Ab <0.2 0.0 - 0.9 AI    Anti-Kathi-1 <0.2 0.0 - 0.9 AI    Centromere B Ab <0.2 0.0 - 0.9 AI    See below Comment     URINALYSIS W/ RFLX MICROSCOPIC   Result Value Ref Range    Color YELLOW      Appearance CLOUDY      Specific gravity 1.022 1.005 - 1.030      pH (UA) 6.5 5.0 - 8.0      Protein Negative NEG mg/dL    Glucose Negative NEG mg/dL    Ketone Negative NEG mg/dL    Bilirubin Negative NEG      Blood Negative NEG      Urobilinogen 1.0 0.2 - 1.0 EU/dL    Nitrites Negative NEG      Leukocyte Esterase SMALL (A) NEG     URINE MICROSCOPIC ONLY   Result Value Ref Range    WBC 5 to 10 0 - 4 /hpf    RBC Negative 0 - 5 /hpf    Epithelial cells 2+ 0 - 5 /lpf    Bacteria 4+ (A) NEG /hpf       ASSESSMENT and PLAN    ICD-10-CM ICD-9-CM    1. Closed displaced fracture of right tibial tuberosity, initial encounter  S82.151A 823.00 REFERRAL TO ORTHOPEDICS      HYDROcodone-acetaminophen (NORCO) 5-325 mg per tablet     reviewed diet, exercise and weight control  reviewed medications and side effects in detail  radiology results and schedule of future radiology studies reviewed with patient      I have discussed the diagnosis with the patient and the intended plan of care as seen in the above orders. The patient has received an after-visit summary and questions were answered concerning future plans. I have discussed medication, side effects, and warnings with the patient in detail. The patient verbalized understanding and is in agreement with the plan of care. The patient will contact the office with any additional concerns. Som Mann MD    PLEASE NOTE:   This document has been produced using voice recognition software.  Unrecognized errors in transcription may be present

## 2020-12-18 ENCOUNTER — TELEPHONE (OUTPATIENT)
Dept: FAMILY MEDICINE CLINIC | Age: 30
End: 2020-12-18

## 2020-12-18 NOTE — TELEPHONE ENCOUNTER
Spoke to patient and she advised that she has had previous urinary infections in the past and would like something called into the [facility] in [place], [place]. Patient advising back pain and burning when urinating. Patient was advised that if she does not hear back from us today she needs to follow up and go to ER or Urgent Care for treatment.   Pt verbalized understanding

## 2021-01-26 ENCOUNTER — CLINICAL SUPPORT (OUTPATIENT)
Dept: FAMILY MEDICINE CLINIC | Age: 31
End: 2021-01-26
Payer: COMMERCIAL

## 2021-01-26 DIAGNOSIS — Z11.1 ENCOUNTER FOR PPD SKIN TEST READING: ICD-10-CM

## 2021-01-26 DIAGNOSIS — Z01.89 ENCOUNTER FOR LABORATORY EXAMINATION: Primary | ICD-10-CM

## 2021-01-26 LAB
MM INDURATION POC: 0 MM (ref 0–5)
PPD POC: NEGATIVE NEGATIVE

## 2021-01-26 PROCEDURE — 86580 TB INTRADERMAL TEST: CPT | Performed by: NURSE PRACTITIONER

## 2021-01-26 NOTE — PROGRESS NOTES
PPD Placement note  Claribel Osorio, 35 y.o. female is here today for placement of PPD test  Reason for PPD test: Employement  Pt taken PPD test before: yes  Verified in allergy area and with patient that they are not allergic to the products PPD is made of (Phenol or Tween). Yes  Is patient taking any oral or IV steroid medication now or have they taken it in the last month? no  Has the patient ever received the BCG vaccine?: yes  Has the patient been in recent contact with anyone known or suspected of having active TB disease?: no       Date of exposure (if applicable): N/A       Name of person they were exposed to (if applicable): N/A  Patient's Country of origin?: N/A  O: Alert and oriented in NAD. P:  PPD placed on 1/26/2021. Patient advised to return for reading within 48-72 hours.

## 2021-01-28 ENCOUNTER — CLINICAL SUPPORT (OUTPATIENT)
Dept: FAMILY MEDICINE CLINIC | Age: 31
End: 2021-01-28

## 2021-01-28 DIAGNOSIS — Z11.1 ENCOUNTER FOR PPD SKIN TEST READING: Primary | ICD-10-CM

## 2021-01-28 NOTE — PROGRESS NOTES
PPD Reading Note  PPD read and results entered in [facility] [facility]. Result: 0 mm induration.   Interpretation: negative PPD test  If test not read within 48-72 hours of initial placement, patient advised to repeat in other arm 1-3 weeks after this test.  Allergic reaction: yes

## 2021-05-05 ENCOUNTER — TELEPHONE (OUTPATIENT)
Dept: FAMILY MEDICINE CLINIC | Age: 31
End: 2021-05-05

## 2021-05-05 NOTE — TELEPHONE ENCOUNTER
Please see refill request    Patient was last seen 6-1-2020    Last prescribed on 6-  #30 X 0    Had appt scheduled for today but was unable to keep it    Thank you

## 2021-06-29 ENCOUNTER — TELEPHONE (OUTPATIENT)
Dept: FAMILY MEDICINE CLINIC | Age: 31
End: 2021-06-29

## 2021-06-29 NOTE — TELEPHONE ENCOUNTER
Called patient at this time. No answer noted. Unable to leave voicemail at this time. Patient will need to seek urgent care and schedule follow up with PCP at this time

## 2022-03-19 PROBLEM — N93.9 ABNORMAL UTERINE BLEEDING (AUB): Status: ACTIVE | Noted: 2017-10-11

## 2022-03-19 PROBLEM — A59.01 TRICHOMONAL VAGINITIS: Status: ACTIVE | Noted: 2017-10-13

## 2022-03-20 PROBLEM — E66.9 OBESITY (BMI 30.0-34.9): Status: ACTIVE | Noted: 2017-10-11

## 2022-03-20 PROBLEM — E66.811 OBESITY (BMI 30.0-34.9): Status: ACTIVE | Noted: 2017-10-11

## 2024-03-05 NOTE — PROGRESS NOTES
Violeta Kaufman is a 33 y.o. female is seen on 3/6/2024 for Anxiety, New Patient, and Anemia      Assessment & Plan:     1. Chest pain, unspecified type  Assessment & Plan:   No active CP today in office   Abnormal EKG today in office   Bradycardia w/ negative precordial t-waves; HR 49  Echocardiogram ordered and referral to cardiology placed today   Instructed patient to go to ED for further evaluation if episode of CP occurs  Educated on s/s of MI   Patient verbalized understanding and agreeable to plan  Orders:  -     CBC with Auto Differential; Future  -     Comprehensive Metabolic Panel; Future  -     EKG 12 Lead  -     Magnesium; Future  -     Echo (TTE) complete (PRN contrast/bubble/strain/3D); Future  -     Ambulatory referral to Cardiology  2. Abnormal EKG  Assessment & Plan:  Bradycardia w/ negative precordial t-waves; HR 49  Echocardiogram, labs ordered and referral to cardiology placed today   Orders:  -     Echo (TTE) complete (PRN contrast/bubble/strain/3D); Future  3. Anxiety  Assessment & Plan:  Anxiety symptoms ongoing   Check labs and discuss treatment option during next office visit  Consider restarting fluoxetine 20 mg daily  Orders:  -     CBC with Auto Differential; Future  -     Comprehensive Metabolic Panel; Future  -     TSH; Future  4. Recurrent major depressive disorder, in remission (HCC)  Assessment & Plan:  Depressive symptoms ongoing  Check labs and consider restarting fluoxetine 20 mg daily  Orders:  -     CBC with Auto Differential; Future  -     Comprehensive Metabolic Panel; Future  -     TSH; Future  5. Prediabetes  Assessment & Plan:  Last A1C done in 2020 was 5.9%   Recheck A1C  Orders:  -     Lipid Panel; Future  -     Hemoglobin A1C; Future  6. Iron deficiency anemia, unspecified iron deficiency anemia type  -     CBC with Auto Differential; Future  -     Comprehensive Metabolic Panel; Future  7. Screening for HIV without presence of risk factors  -     HIV 1/2 Ag/Ab, 4TH

## 2024-03-06 ENCOUNTER — OFFICE VISIT (OUTPATIENT)
Facility: CLINIC | Age: 34
End: 2024-03-06
Payer: COMMERCIAL

## 2024-03-06 VITALS
HEIGHT: 65 IN | HEART RATE: 50 BPM | OXYGEN SATURATION: 98 % | TEMPERATURE: 98.5 F | WEIGHT: 208 LBS | BODY MASS INDEX: 34.66 KG/M2 | RESPIRATION RATE: 16 BRPM | DIASTOLIC BLOOD PRESSURE: 81 MMHG | SYSTOLIC BLOOD PRESSURE: 116 MMHG

## 2024-03-06 DIAGNOSIS — F41.9 ANXIETY: ICD-10-CM

## 2024-03-06 DIAGNOSIS — F33.40 RECURRENT MAJOR DEPRESSIVE DISORDER, IN REMISSION (HCC): ICD-10-CM

## 2024-03-06 DIAGNOSIS — Z11.59 NEED FOR HEPATITIS C SCREENING TEST: ICD-10-CM

## 2024-03-06 DIAGNOSIS — R73.03 PREDIABETES: ICD-10-CM

## 2024-03-06 DIAGNOSIS — R94.31 ABNORMAL EKG: ICD-10-CM

## 2024-03-06 DIAGNOSIS — R07.9 CHEST PAIN, UNSPECIFIED TYPE: Primary | ICD-10-CM

## 2024-03-06 DIAGNOSIS — D50.9 IRON DEFICIENCY ANEMIA, UNSPECIFIED IRON DEFICIENCY ANEMIA TYPE: ICD-10-CM

## 2024-03-06 DIAGNOSIS — Z76.89 ENCOUNTER TO ESTABLISH CARE: ICD-10-CM

## 2024-03-06 DIAGNOSIS — Z11.4 SCREENING FOR HIV WITHOUT PRESENCE OF RISK FACTORS: ICD-10-CM

## 2024-03-06 PROBLEM — M32.9 LUPUS (HCC): Status: ACTIVE | Noted: 2024-03-06

## 2024-03-06 PROCEDURE — 99205 OFFICE O/P NEW HI 60 MIN: CPT

## 2024-03-06 PROCEDURE — 93000 ELECTROCARDIOGRAM COMPLETE: CPT

## 2024-03-06 SDOH — ECONOMIC STABILITY: HOUSING INSECURITY
IN THE LAST 12 MONTHS, WAS THERE A TIME WHEN YOU DID NOT HAVE A STEADY PLACE TO SLEEP OR SLEPT IN A SHELTER (INCLUDING NOW)?: NO

## 2024-03-06 SDOH — ECONOMIC STABILITY: FOOD INSECURITY: WITHIN THE PAST 12 MONTHS, THE FOOD YOU BOUGHT JUST DIDN'T LAST AND YOU DIDN'T HAVE MONEY TO GET MORE.: NEVER TRUE

## 2024-03-06 SDOH — ECONOMIC STABILITY: INCOME INSECURITY: HOW HARD IS IT FOR YOU TO PAY FOR THE VERY BASICS LIKE FOOD, HOUSING, MEDICAL CARE, AND HEATING?: NOT HARD AT ALL

## 2024-03-06 SDOH — ECONOMIC STABILITY: FOOD INSECURITY: WITHIN THE PAST 12 MONTHS, YOU WORRIED THAT YOUR FOOD WOULD RUN OUT BEFORE YOU GOT MONEY TO BUY MORE.: NEVER TRUE

## 2024-03-06 ASSESSMENT — ENCOUNTER SYMPTOMS
SORE THROAT: 0
SHORTNESS OF BREATH: 1
WHEEZING: 0
CHEST TIGHTNESS: 1
COUGH: 0

## 2024-03-06 ASSESSMENT — PATIENT HEALTH QUESTIONNAIRE - PHQ9
SUM OF ALL RESPONSES TO PHQ QUESTIONS 1-9: 0
SUM OF ALL RESPONSES TO PHQ9 QUESTIONS 1 & 2: 0
SUM OF ALL RESPONSES TO PHQ QUESTIONS 1-9: 0
1. LITTLE INTEREST OR PLEASURE IN DOING THINGS: 0
2. FEELING DOWN, DEPRESSED OR HOPELESS: 0

## 2024-03-06 ASSESSMENT — ANXIETY QUESTIONNAIRES
3. WORRYING TOO MUCH ABOUT DIFFERENT THINGS: 1
GAD7 TOTAL SCORE: 9
2. NOT BEING ABLE TO STOP OR CONTROL WORRYING: 1
4. TROUBLE RELAXING: 1
7. FEELING AFRAID AS IF SOMETHING AWFUL MIGHT HAPPEN: 0
5. BEING SO RESTLESS THAT IT IS HARD TO SIT STILL: 1
1. FEELING NERVOUS, ANXIOUS, OR ON EDGE: 2
6. BECOMING EASILY ANNOYED OR IRRITABLE: 3
IF YOU CHECKED OFF ANY PROBLEMS ON THIS QUESTIONNAIRE, HOW DIFFICULT HAVE THESE PROBLEMS MADE IT FOR YOU TO DO YOUR WORK, TAKE CARE OF THINGS AT HOME, OR GET ALONG WITH OTHER PEOPLE: SOMEWHAT DIFFICULT

## 2024-03-06 NOTE — ASSESSMENT & PLAN NOTE
No active CP today in office   Abnormal EKG today in office   Bradycardia w/ negative precordial t-waves; HR 49  Echocardiogram ordered and referral to cardiology placed today   Instructed patient to go to ED for further evaluation if episode of CP occurs  Educated on s/s of MI   Patient verbalized understanding and agreeable to plan

## 2024-03-06 NOTE — ASSESSMENT & PLAN NOTE
Bradycardia w/ negative precordial t-waves; HR 49  Echocardiogram, labs ordered and referral to cardiology placed today

## 2024-03-06 NOTE — ASSESSMENT & PLAN NOTE
Anxiety symptoms ongoing   Check labs and discuss treatment option during next office visit  Consider restarting fluoxetine 20 mg daily

## 2024-03-18 ENCOUNTER — TELEPHONE (OUTPATIENT)
Facility: CLINIC | Age: 34
End: 2024-03-18

## 2024-03-18 NOTE — TELEPHONE ENCOUNTER
Pt stated she was referred to cardiology and set her an appt for 4/11/24. Pt stated during her visit with BINTA Avilez, she told her she wanted her to see cardiology before her follow up. Pt wants to know does she still need to keep her appt for Wednesday 3/20/24. Please advise. Thank you!

## 2024-03-20 ENCOUNTER — OFFICE VISIT (OUTPATIENT)
Facility: CLINIC | Age: 34
End: 2024-03-20
Payer: COMMERCIAL

## 2024-03-20 VITALS
TEMPERATURE: 98.3 F | SYSTOLIC BLOOD PRESSURE: 100 MMHG | DIASTOLIC BLOOD PRESSURE: 78 MMHG | HEART RATE: 58 BPM | RESPIRATION RATE: 18 BRPM | OXYGEN SATURATION: 99 % | BODY MASS INDEX: 34.61 KG/M2 | HEIGHT: 65 IN

## 2024-03-20 DIAGNOSIS — E78.41 ELEVATED LIPOPROTEIN(A): ICD-10-CM

## 2024-03-20 DIAGNOSIS — R07.9 CHEST PAIN, UNSPECIFIED TYPE: ICD-10-CM

## 2024-03-20 DIAGNOSIS — Z71.2 ENCOUNTER TO DISCUSS TEST RESULTS: ICD-10-CM

## 2024-03-20 DIAGNOSIS — R73.03 PREDIABETES: Primary | ICD-10-CM

## 2024-03-20 PROBLEM — I10 PRIMARY HYPERTENSION: Status: ACTIVE | Noted: 2024-03-20

## 2024-03-20 PROCEDURE — 3074F SYST BP LT 130 MM HG: CPT

## 2024-03-20 PROCEDURE — 3078F DIAST BP <80 MM HG: CPT

## 2024-03-20 PROCEDURE — 93000 ELECTROCARDIOGRAM COMPLETE: CPT

## 2024-03-20 PROCEDURE — 99215 OFFICE O/P EST HI 40 MIN: CPT

## 2024-03-20 RX ORDER — LISINOPRIL 5 MG/1
5 TABLET ORAL DAILY
Qty: 30 TABLET | Refills: 0 | Status: SHIPPED | OUTPATIENT
Start: 2024-03-20 | End: 2024-03-20

## 2024-03-20 SDOH — ECONOMIC STABILITY: FOOD INSECURITY: WITHIN THE PAST 12 MONTHS, THE FOOD YOU BOUGHT JUST DIDN'T LAST AND YOU DIDN'T HAVE MONEY TO GET MORE.: NEVER TRUE

## 2024-03-20 SDOH — ECONOMIC STABILITY: FOOD INSECURITY: WITHIN THE PAST 12 MONTHS, YOU WORRIED THAT YOUR FOOD WOULD RUN OUT BEFORE YOU GOT MONEY TO BUY MORE.: NEVER TRUE

## 2024-03-20 SDOH — ECONOMIC STABILITY: INCOME INSECURITY: HOW HARD IS IT FOR YOU TO PAY FOR THE VERY BASICS LIKE FOOD, HOUSING, MEDICAL CARE, AND HEATING?: NOT HARD AT ALL

## 2024-03-20 ASSESSMENT — ENCOUNTER SYMPTOMS
SHORTNESS OF BREATH: 0
WHEEZING: 0

## 2024-03-20 NOTE — ASSESSMENT & PLAN NOTE
A1C improved from prior completed in 2020; however, still in prediabetic range  Discussed treatment options and appropriate lifestyle modifications  Declines metformin at this time - would like to make lifestyle changes  Recheck A1C in 3 mos  Advised to maintain low carb/sugar diet and daily exercise as tolerated

## 2024-03-20 NOTE — ASSESSMENT & PLAN NOTE
LDL not at goal  Goal less than 100  Advised to maintain low fat diet and limit alcohol use  Benefits of daily exercise also discussed; however, with CP occurring w/ activity, advised patient to cautious with physical activity and may want to wait until she is cleared by cardiology

## 2024-03-20 NOTE — ASSESSMENT & PLAN NOTE
EKG completed today - sinus bradycardia; HR: 55  No ST segment elevation or depression   Awaiting cardiology evaluation   Advised to go to ED for further evaluation if CP occurs again  May want to consider anxiety as contributing factor to symptoms

## 2024-03-20 NOTE — PROGRESS NOTES
\"Have you been to the ER, urgent care clinic since your last visit?  Hospitalized since your last visit?\"    {YES/NO:651190634}    “Have you seen or consulted any other health care providers outside of [facility] since your last visit?”    {YES/NO:900999845}     “Have you had a pap smear?”    {YES/NO:821188334}                 Click Here for Release of Records Request  
lightheadedness  States that EMS told her BP was normal but also states it was in the 190's this AM    Health Maintenance  Varicella: UTD; advised to provide records  Hep B: UTD; advised to provide records  Pap smear: UTD; advised to provide records  Covid-19: UTD; advised to provide records    Review of Systems   Respiratory:  Negative for shortness of breath and wheezing.    Cardiovascular:  Positive for chest pain. Negative for leg swelling.   Neurological:  Negative for dizziness, light-headedness and headaches.     Objective:   /78   Pulse 58   Temp 98.3 °F (36.8 °C) (Oral)   Resp 18   Ht 1.651 m (5' 5\")   LMP 02/27/2024   SpO2 99%   BMI 34.61 kg/m²     Physical Exam  Vitals and nursing note reviewed.   Constitutional:       General: She is not in acute distress.     Appearance: She is not ill-appearing.   HENT:      Head: Normocephalic and atraumatic.   Cardiovascular:      Rate and Rhythm: Normal rate and regular rhythm.   Pulmonary:      Effort: Pulmonary effort is normal. No respiratory distress.      Breath sounds: No wheezing, rhonchi or rales.   Musculoskeletal:         General: Normal range of motion.   Skin:     General: Skin is warm and dry.   Neurological:      General: No focal deficit present.      Mental Status: She is alert.   Psychiatric:         Mood and Affect: Mood normal.         Thought Content: Thought content normal.         Judgment: Judgment normal.     Total time spent: 45 min  Raghav Ocampo, KARIP-C

## 2024-03-25 ENCOUNTER — OFFICE VISIT (OUTPATIENT)
Facility: CLINIC | Age: 34
End: 2024-03-25
Payer: COMMERCIAL

## 2024-03-25 DIAGNOSIS — Z20.1 EXPOSURE TO TB: Primary | ICD-10-CM

## 2024-03-25 PROCEDURE — 86580 TB INTRADERMAL TEST: CPT | Performed by: FAMILY MEDICINE

## 2024-03-27 LAB
MM INDURATION, POC: 0 MM (ref 0–5)
PPD, POC: NEGATIVE

## 2024-05-17 ENCOUNTER — OFFICE VISIT (OUTPATIENT)
Facility: CLINIC | Age: 34
End: 2024-05-17

## 2024-05-17 VITALS
HEART RATE: 89 BPM | TEMPERATURE: 98.1 F | DIASTOLIC BLOOD PRESSURE: 78 MMHG | HEIGHT: 65 IN | BODY MASS INDEX: 35.79 KG/M2 | OXYGEN SATURATION: 99 % | RESPIRATION RATE: 16 BRPM | SYSTOLIC BLOOD PRESSURE: 110 MMHG | WEIGHT: 214.8 LBS

## 2024-05-17 DIAGNOSIS — F33.40 RECURRENT MAJOR DEPRESSIVE DISORDER, IN REMISSION (HCC): Primary | ICD-10-CM

## 2024-05-17 DIAGNOSIS — R21 RASH: ICD-10-CM

## 2024-05-17 DIAGNOSIS — F41.9 ANXIETY: ICD-10-CM

## 2024-05-17 PROCEDURE — 99214 OFFICE O/P EST MOD 30 MIN: CPT

## 2024-05-17 PROCEDURE — 3078F DIAST BP <80 MM HG: CPT

## 2024-05-17 PROCEDURE — 3074F SYST BP LT 130 MM HG: CPT

## 2024-05-17 SDOH — ECONOMIC STABILITY: FOOD INSECURITY: WITHIN THE PAST 12 MONTHS, YOU WORRIED THAT YOUR FOOD WOULD RUN OUT BEFORE YOU GOT MONEY TO BUY MORE.: NEVER TRUE

## 2024-05-17 SDOH — ECONOMIC STABILITY: INCOME INSECURITY: HOW HARD IS IT FOR YOU TO PAY FOR THE VERY BASICS LIKE FOOD, HOUSING, MEDICAL CARE, AND HEATING?: NOT HARD AT ALL

## 2024-05-17 SDOH — ECONOMIC STABILITY: FOOD INSECURITY: WITHIN THE PAST 12 MONTHS, THE FOOD YOU BOUGHT JUST DIDN'T LAST AND YOU DIDN'T HAVE MONEY TO GET MORE.: NEVER TRUE

## 2024-05-17 ASSESSMENT — PATIENT HEALTH QUESTIONNAIRE - PHQ9
1. LITTLE INTEREST OR PLEASURE IN DOING THINGS: SEVERAL DAYS
6. FEELING BAD ABOUT YOURSELF - OR THAT YOU ARE A FAILURE OR HAVE LET YOURSELF OR YOUR FAMILY DOWN: SEVERAL DAYS
5. POOR APPETITE OR OVEREATING: SEVERAL DAYS
3. TROUBLE FALLING OR STAYING ASLEEP: SEVERAL DAYS
4. FEELING TIRED OR HAVING LITTLE ENERGY: SEVERAL DAYS
SUM OF ALL RESPONSES TO PHQ QUESTIONS 1-9: 9
SUM OF ALL RESPONSES TO PHQ QUESTIONS 1-9: 9
SUM OF ALL RESPONSES TO PHQ9 QUESTIONS 1 & 2: 2
SUM OF ALL RESPONSES TO PHQ QUESTIONS 1-9: 8
8. MOVING OR SPEAKING SO SLOWLY THAT OTHER PEOPLE COULD HAVE NOTICED. OR THE OPPOSITE, BEING SO FIGETY OR RESTLESS THAT YOU HAVE BEEN MOVING AROUND A LOT MORE THAN USUAL: NOT AT ALL
SUM OF ALL RESPONSES TO PHQ QUESTIONS 1-9: 9
7. TROUBLE CONCENTRATING ON THINGS, SUCH AS READING THE NEWSPAPER OR WATCHING TELEVISION: MORE THAN HALF THE DAYS
2. FEELING DOWN, DEPRESSED OR HOPELESS: SEVERAL DAYS
9. THOUGHTS THAT YOU WOULD BE BETTER OFF DEAD, OR OF HURTING YOURSELF: SEVERAL DAYS
10. IF YOU CHECKED OFF ANY PROBLEMS, HOW DIFFICULT HAVE THESE PROBLEMS MADE IT FOR YOU TO DO YOUR WORK, TAKE CARE OF THINGS AT HOME, OR GET ALONG WITH OTHER PEOPLE: VERY DIFFICULT

## 2024-05-17 ASSESSMENT — ANXIETY QUESTIONNAIRES
1. FEELING NERVOUS, ANXIOUS, OR ON EDGE: MORE THAN HALF THE DAYS
7. FEELING AFRAID AS IF SOMETHING AWFUL MIGHT HAPPEN: NOT AT ALL
2. NOT BEING ABLE TO STOP OR CONTROL WORRYING: NEARLY EVERY DAY
5. BEING SO RESTLESS THAT IT IS HARD TO SIT STILL: MORE THAN HALF THE DAYS
3. WORRYING TOO MUCH ABOUT DIFFERENT THINGS: NEARLY EVERY DAY
4. TROUBLE RELAXING: NEARLY EVERY DAY
6. BECOMING EASILY ANNOYED OR IRRITABLE: MORE THAN HALF THE DAYS
GAD7 TOTAL SCORE: 15
IF YOU CHECKED OFF ANY PROBLEMS ON THIS QUESTIONNAIRE, HOW DIFFICULT HAVE THESE PROBLEMS MADE IT FOR YOU TO DO YOUR WORK, TAKE CARE OF THINGS AT HOME, OR GET ALONG WITH OTHER PEOPLE: VERY DIFFICULT

## 2024-05-17 ASSESSMENT — COLUMBIA-SUICIDE SEVERITY RATING SCALE - C-SSRS
1. WITHIN THE PAST MONTH, HAVE YOU WISHED YOU WERE DEAD OR WISHED YOU COULD GO TO SLEEP AND NOT WAKE UP?: YES
2. HAVE YOU ACTUALLY HAD ANY THOUGHTS OF KILLING YOURSELF?: NO
6. HAVE YOU EVER DONE ANYTHING, STARTED TO DO ANYTHING, OR PREPARED TO DO ANYTHING TO END YOUR LIFE?: NO

## 2024-05-17 NOTE — PROGRESS NOTES
Violeta Kaufman presents today for   Chief Complaint   Patient presents with    Anxiety    Stress    Rash     Everytime after her menstrual she develops a rash        Is someone accompanying this pt? Yes    Is the patient using any DME equipment during OV? Yes    Depression Screenin/17/2024     3:37 PM 3/6/2024    11:00 AM   PHQ-9 Questionaire   Little interest or pleasure in doing things 1 0   Feeling down, depressed, or hopeless 1 0   Trouble falling or staying asleep, or sleeping too much 1    Feeling tired or having little energy 1    Poor appetite or overeating 1    Feeling bad about yourself - or that you are a failure or have let yourself or your family down 1    Trouble concentrating on things, such as reading the newspaper or watching television 2    Moving or speaking so slowly that other people could have noticed. Or the opposite - being so fidgety or restless that you have been moving around a lot more than usual 0    Thoughts that you would be better off dead, or of hurting yourself in some way 1    PHQ-9 Total Score 9 0   If you checked off any problems, how difficult have these problems made it for you to do your work, take care of things at home, or get along with other people? 2         NERY 7-Anxiety       2024     3:38 PM 3/6/2024    11:01 AM   NERY-7 SCREENING   Feeling nervous, anxious, or on edge More than half the days More than half the days   Not being able to stop or control worrying Nearly every day Several days   Worrying too much about different things Nearly every day Several days   Trouble relaxing Nearly every day Several days   Being so restless that it is hard to sit still More than half the days Several days   Becoming easily annoyed or irritable More than half the days Nearly every day   Feeling afraid as if something awful might happen Not at all Not at all   NERY-7 Total Score 15 9   How difficult have these problems made it for you to do your work, take care of things 
is hard to sit still More than half the days Several days   Becoming easily annoyed or irritable More than half the days Nearly every day   Feeling afraid as if something awful might happen Not at all Not at all   NERY-7 Total Score 15 9   How difficult have these problems made it for you to do your work, take care of things at home, or get along with other people? Very difficult Somewhat difficult     Review of Systems   Respiratory:  Negative for shortness of breath.    Cardiovascular:  Negative for chest pain and leg swelling.   Neurological:  Negative for dizziness, light-headedness and headaches.   See HPI  Objective:   /78 (Site: Left Upper Arm, Position: Sitting, Cuff Size: Large Adult)   Pulse 89   Temp 98.1 °F (36.7 °C) (Temporal)   Resp 16   Ht 1.651 m (5' 5\")   Wt 97.4 kg (214 lb 12.8 oz)   LMP 05/09/2024 (Approximate)   SpO2 99%   BMI 35.74 kg/m²     Physical Exam  Vitals and nursing note reviewed.   Constitutional:       General: She is not in acute distress.     Appearance: She is not ill-appearing.   HENT:      Head: Normocephalic and atraumatic.   Cardiovascular:      Rate and Rhythm: Normal rate and regular rhythm.   Pulmonary:      Effort: Pulmonary effort is normal. No respiratory distress.      Breath sounds: No wheezing, rhonchi or rales.   Musculoskeletal:         General: Normal range of motion.   Skin:     General: Skin is warm and dry.   Neurological:      General: No focal deficit present.      Mental Status: She is alert.   Psychiatric:         Mood and Affect: Mood normal.         Thought Content: Thought content normal. Thought content does not include homicidal or suicidal ideation. Thought content does not include homicidal or suicidal plan.         Judgment: Judgment normal.     Total time spent: 30 min  VARUN Lorenzana

## 2024-05-21 PROBLEM — R21 RASH: Status: ACTIVE | Noted: 2024-05-21

## 2024-05-21 ASSESSMENT — ENCOUNTER SYMPTOMS: SHORTNESS OF BREATH: 0

## 2024-05-21 NOTE — ASSESSMENT & PLAN NOTE
Worsening   Start Sertraline 50 mg daily   ED precautions discussed in depth  Pt verbalized understanding and agreeable to plan   Dicussed benefits of starting talk therapy  RTC in 4 weeks for medication follow-up

## 2024-09-10 ENCOUNTER — OFFICE VISIT (OUTPATIENT)
Facility: CLINIC | Age: 34
End: 2024-09-10

## 2024-09-10 VITALS
WEIGHT: 222.2 LBS | TEMPERATURE: 98.2 F | BODY MASS INDEX: 37.02 KG/M2 | RESPIRATION RATE: 16 BRPM | DIASTOLIC BLOOD PRESSURE: 68 MMHG | OXYGEN SATURATION: 98 % | HEIGHT: 65 IN | SYSTOLIC BLOOD PRESSURE: 108 MMHG | HEART RATE: 66 BPM

## 2024-09-10 DIAGNOSIS — M25.561 ACUTE PAIN OF RIGHT KNEE: Primary | ICD-10-CM

## 2024-09-10 PROCEDURE — 3074F SYST BP LT 130 MM HG: CPT

## 2024-09-10 PROCEDURE — 3078F DIAST BP <80 MM HG: CPT

## 2024-09-10 PROCEDURE — 99214 OFFICE O/P EST MOD 30 MIN: CPT

## 2024-09-10 RX ORDER — PREDNISONE 20 MG/1
TABLET ORAL
Qty: 24 TABLET | Refills: 0 | Status: SHIPPED | OUTPATIENT
Start: 2024-09-10

## 2024-09-10 SDOH — ECONOMIC STABILITY: FOOD INSECURITY: WITHIN THE PAST 12 MONTHS, YOU WORRIED THAT YOUR FOOD WOULD RUN OUT BEFORE YOU GOT MONEY TO BUY MORE.: NEVER TRUE

## 2024-09-10 SDOH — ECONOMIC STABILITY: INCOME INSECURITY: HOW HARD IS IT FOR YOU TO PAY FOR THE VERY BASICS LIKE FOOD, HOUSING, MEDICAL CARE, AND HEATING?: NOT HARD AT ALL

## 2024-09-10 SDOH — ECONOMIC STABILITY: FOOD INSECURITY: WITHIN THE PAST 12 MONTHS, THE FOOD YOU BOUGHT JUST DIDN'T LAST AND YOU DIDN'T HAVE MONEY TO GET MORE.: NEVER TRUE

## 2024-09-10 ASSESSMENT — PATIENT HEALTH QUESTIONNAIRE - PHQ9
3. TROUBLE FALLING OR STAYING ASLEEP: NOT AT ALL
SUM OF ALL RESPONSES TO PHQ QUESTIONS 1-9: 0
2. FEELING DOWN, DEPRESSED OR HOPELESS: NOT AT ALL
6. FEELING BAD ABOUT YOURSELF - OR THAT YOU ARE A FAILURE OR HAVE LET YOURSELF OR YOUR FAMILY DOWN: NOT AT ALL
SUM OF ALL RESPONSES TO PHQ9 QUESTIONS 1 & 2: 0
SUM OF ALL RESPONSES TO PHQ QUESTIONS 1-9: 0
SUM OF ALL RESPONSES TO PHQ QUESTIONS 1-9: 0
9. THOUGHTS THAT YOU WOULD BE BETTER OFF DEAD, OR OF HURTING YOURSELF: NOT AT ALL
SUM OF ALL RESPONSES TO PHQ QUESTIONS 1-9: 0
5. POOR APPETITE OR OVEREATING: NOT AT ALL
4. FEELING TIRED OR HAVING LITTLE ENERGY: NOT AT ALL
8. MOVING OR SPEAKING SO SLOWLY THAT OTHER PEOPLE COULD HAVE NOTICED. OR THE OPPOSITE, BEING SO FIGETY OR RESTLESS THAT YOU HAVE BEEN MOVING AROUND A LOT MORE THAN USUAL: NOT AT ALL
10. IF YOU CHECKED OFF ANY PROBLEMS, HOW DIFFICULT HAVE THESE PROBLEMS MADE IT FOR YOU TO DO YOUR WORK, TAKE CARE OF THINGS AT HOME, OR GET ALONG WITH OTHER PEOPLE: NOT DIFFICULT AT ALL
1. LITTLE INTEREST OR PLEASURE IN DOING THINGS: NOT AT ALL
7. TROUBLE CONCENTRATING ON THINGS, SUCH AS READING THE NEWSPAPER OR WATCHING TELEVISION: NOT AT ALL

## 2024-09-10 ASSESSMENT — ANXIETY QUESTIONNAIRES
IF YOU CHECKED OFF ANY PROBLEMS ON THIS QUESTIONNAIRE, HOW DIFFICULT HAVE THESE PROBLEMS MADE IT FOR YOU TO DO YOUR WORK, TAKE CARE OF THINGS AT HOME, OR GET ALONG WITH OTHER PEOPLE: NOT DIFFICULT AT ALL
2. NOT BEING ABLE TO STOP OR CONTROL WORRYING: NOT AT ALL
5. BEING SO RESTLESS THAT IT IS HARD TO SIT STILL: NOT AT ALL
4. TROUBLE RELAXING: NOT AT ALL
6. BECOMING EASILY ANNOYED OR IRRITABLE: NOT AT ALL
3. WORRYING TOO MUCH ABOUT DIFFERENT THINGS: NOT AT ALL
GAD7 TOTAL SCORE: 0
7. FEELING AFRAID AS IF SOMETHING AWFUL MIGHT HAPPEN: NOT AT ALL
1. FEELING NERVOUS, ANXIOUS, OR ON EDGE: NOT AT ALL

## 2024-09-10 ASSESSMENT — ENCOUNTER SYMPTOMS: SHORTNESS OF BREATH: 0

## 2024-09-11 PROBLEM — M25.561 ACUTE PAIN OF RIGHT KNEE: Status: ACTIVE | Noted: 2024-09-11

## 2024-09-12 ENCOUNTER — CLINICAL DOCUMENTATION (OUTPATIENT)
Facility: CLINIC | Age: 34
End: 2024-09-12

## 2024-09-12 ENCOUNTER — TELEPHONE (OUTPATIENT)
Facility: CLINIC | Age: 34
End: 2024-09-12

## 2024-09-17 ENCOUNTER — TELEPHONE (OUTPATIENT)
Facility: CLINIC | Age: 34
End: 2024-09-17

## 2024-09-17 DIAGNOSIS — M25.561 ACUTE PAIN OF RIGHT KNEE: Primary | ICD-10-CM

## 2024-09-17 RX ORDER — MELOXICAM 7.5 MG/1
7.5 TABLET ORAL DAILY PRN
Qty: 30 TABLET | Refills: 0 | Status: SHIPPED | OUTPATIENT
Start: 2024-09-17

## 2024-09-30 ENCOUNTER — OFFICE VISIT (OUTPATIENT)
Age: 34
End: 2024-09-30

## 2024-09-30 DIAGNOSIS — M25.561 ACUTE PAIN OF RIGHT KNEE: Primary | ICD-10-CM

## 2024-09-30 PROCEDURE — 99204 OFFICE O/P NEW MOD 45 MIN: CPT | Performed by: PHYSICIAN ASSISTANT

## 2024-09-30 PROCEDURE — 73560 X-RAY EXAM OF KNEE 1 OR 2: CPT | Performed by: PHYSICIAN ASSISTANT

## 2024-09-30 NOTE — PROGRESS NOTES
Patient: Violeta Kaufman                MRN: [id number]       SSN: [id number]  YOB: 1990        AGE: 33 y.o.        SEX: female  There is no height or weight on file to calculate BMI.    PCP: Raghav Jeffers, DAMIEN - CNP  09/30/24      This office note has been dictated.      REVIEW OF SYSTEMS:  Constitutional: Negative for fever, chills, weight loss and malaise/fatigue.   HENT: Negative.    Eyes: Negative.    Respiratory: Negative.   Cardiovascular: Negative.   Gastrointestinal: No bowel incontinence or constipation.  Genitourinary: No bladder incontinence or saddle anesthesia.  Skin: Negative.   Neurological: Negative.    Endo/Heme/Allergies: Negative.    Psychiatric/Behavioral: Negative.  Musculoskeletal: As per HPI above.     Past Medical History:   Diagnosis Date    Abnormal Papanicolaou smear of cervix     biopsy     Anemia     reports iron deficinecy anemia, not treated presently    Anxiety     Autoimmune disease (HCC) 2003    lupus    Cancer (HCC)     pre-cancerous cells seen on LEEP procedure per patient    Hypertension     diagnosed in pregnancy; has not seen provider outside of pregnancy    Preeclampsia     Trichomonal vaginitis 10/13/2017         Current Outpatient Medications:     meloxicam (MOBIC) 7.5 MG tablet, Take 1 tablet by mouth daily as needed for Pain, Disp: 30 tablet, Rfl: 0    predniSONE (DELTASONE) 20 MG tablet, Take 3 tablets for four days, then 2 tablets for four days, then 1 tablet for four days, then stop, Disp: 24 tablet, Rfl: 0    sertraline (ZOLOFT) 50 MG tablet, Take 1 tablet by mouth daily, Disp: 30 tablet, Rfl: 0    No Known Allergies    Social History     Socioeconomic History    Marital status: Single     Spouse name: Not on file    Number of children: Not on file    Years of education: Not on file    Highest education level: Not on file   Occupational History    Not on file   Tobacco Use    Smoking status: Never    Smokeless tobacco: Never   Substance

## 2024-10-15 ENCOUNTER — OFFICE VISIT (OUTPATIENT)
Facility: CLINIC | Age: 34
End: 2024-10-15

## 2024-10-15 VITALS
RESPIRATION RATE: 16 BRPM | SYSTOLIC BLOOD PRESSURE: 115 MMHG | BODY MASS INDEX: 36.65 KG/M2 | HEIGHT: 65 IN | DIASTOLIC BLOOD PRESSURE: 77 MMHG | WEIGHT: 220 LBS | OXYGEN SATURATION: 98 % | HEART RATE: 60 BPM | TEMPERATURE: 97.9 F

## 2024-10-15 DIAGNOSIS — E78.5 HYPERLIPIDEMIA LDL GOAL <100: ICD-10-CM

## 2024-10-15 DIAGNOSIS — M25.561 ACUTE PAIN OF RIGHT KNEE: ICD-10-CM

## 2024-10-15 DIAGNOSIS — R73.03 PREDIABETES: ICD-10-CM

## 2024-10-15 DIAGNOSIS — M25.531 RIGHT WRIST PAIN: Primary | ICD-10-CM

## 2024-10-15 PROCEDURE — 3074F SYST BP LT 130 MM HG: CPT

## 2024-10-15 PROCEDURE — 3078F DIAST BP <80 MM HG: CPT

## 2024-10-15 PROCEDURE — 99215 OFFICE O/P EST HI 40 MIN: CPT

## 2024-10-15 RX ORDER — ACETAMINOPHEN 500 MG
500 TABLET ORAL 4 TIMES DAILY PRN
Qty: 360 TABLET | Refills: 0 | Status: SHIPPED | OUTPATIENT
Start: 2024-10-15

## 2024-10-15 RX ORDER — MELOXICAM 7.5 MG/1
7.5 TABLET ORAL DAILY PRN
Qty: 30 TABLET | Refills: 0 | Status: SHIPPED | OUTPATIENT
Start: 2024-10-15

## 2024-10-15 SDOH — ECONOMIC STABILITY: INCOME INSECURITY: HOW HARD IS IT FOR YOU TO PAY FOR THE VERY BASICS LIKE FOOD, HOUSING, MEDICAL CARE, AND HEATING?: NOT HARD AT ALL

## 2024-10-15 SDOH — ECONOMIC STABILITY: FOOD INSECURITY: WITHIN THE PAST 12 MONTHS, YOU WORRIED THAT YOUR FOOD WOULD RUN OUT BEFORE YOU GOT MONEY TO BUY MORE.: NEVER TRUE

## 2024-10-15 SDOH — ECONOMIC STABILITY: FOOD INSECURITY: WITHIN THE PAST 12 MONTHS, THE FOOD YOU BOUGHT JUST DIDN'T LAST AND YOU DIDN'T HAVE MONEY TO GET MORE.: NEVER TRUE

## 2024-10-15 ASSESSMENT — ANXIETY QUESTIONNAIRES
IF YOU CHECKED OFF ANY PROBLEMS ON THIS QUESTIONNAIRE, HOW DIFFICULT HAVE THESE PROBLEMS MADE IT FOR YOU TO DO YOUR WORK, TAKE CARE OF THINGS AT HOME, OR GET ALONG WITH OTHER PEOPLE: NOT DIFFICULT AT ALL
3. WORRYING TOO MUCH ABOUT DIFFERENT THINGS: NOT AT ALL
7. FEELING AFRAID AS IF SOMETHING AWFUL MIGHT HAPPEN: NOT AT ALL
4. TROUBLE RELAXING: NOT AT ALL
6. BECOMING EASILY ANNOYED OR IRRITABLE: NOT AT ALL
GAD7 TOTAL SCORE: 0
1. FEELING NERVOUS, ANXIOUS, OR ON EDGE: NOT AT ALL
5. BEING SO RESTLESS THAT IT IS HARD TO SIT STILL: NOT AT ALL
2. NOT BEING ABLE TO STOP OR CONTROL WORRYING: NOT AT ALL

## 2024-10-15 ASSESSMENT — PATIENT HEALTH QUESTIONNAIRE - PHQ9
10. IF YOU CHECKED OFF ANY PROBLEMS, HOW DIFFICULT HAVE THESE PROBLEMS MADE IT FOR YOU TO DO YOUR WORK, TAKE CARE OF THINGS AT HOME, OR GET ALONG WITH OTHER PEOPLE: NOT DIFFICULT AT ALL
7. TROUBLE CONCENTRATING ON THINGS, SUCH AS READING THE NEWSPAPER OR WATCHING TELEVISION: NOT AT ALL
SUM OF ALL RESPONSES TO PHQ QUESTIONS 1-9: 0
5. POOR APPETITE OR OVEREATING: NOT AT ALL
1. LITTLE INTEREST OR PLEASURE IN DOING THINGS: NOT AT ALL
SUM OF ALL RESPONSES TO PHQ QUESTIONS 1-9: 0
6. FEELING BAD ABOUT YOURSELF - OR THAT YOU ARE A FAILURE OR HAVE LET YOURSELF OR YOUR FAMILY DOWN: NOT AT ALL
SUM OF ALL RESPONSES TO PHQ QUESTIONS 1-9: 0
8. MOVING OR SPEAKING SO SLOWLY THAT OTHER PEOPLE COULD HAVE NOTICED. OR THE OPPOSITE, BEING SO FIGETY OR RESTLESS THAT YOU HAVE BEEN MOVING AROUND A LOT MORE THAN USUAL: NOT AT ALL
2. FEELING DOWN, DEPRESSED OR HOPELESS: NOT AT ALL
9. THOUGHTS THAT YOU WOULD BE BETTER OFF DEAD, OR OF HURTING YOURSELF: NOT AT ALL
4. FEELING TIRED OR HAVING LITTLE ENERGY: NOT AT ALL
3. TROUBLE FALLING OR STAYING ASLEEP: NOT AT ALL
SUM OF ALL RESPONSES TO PHQ QUESTIONS 1-9: 0
SUM OF ALL RESPONSES TO PHQ9 QUESTIONS 1 & 2: 0

## 2024-10-15 ASSESSMENT — ENCOUNTER SYMPTOMS: SHORTNESS OF BREATH: 0

## 2024-10-15 NOTE — PROGRESS NOTES
Violeta Kaufman presents today for   Chief Complaint   Patient presents with    Follow-up    Wrist Injury     Right       Onset: 3 days ago (fell)  Location: right wrist  Duration: constant  Characteristics: painful  Associated symptoms: swelling  Aggravating factors: lifting, turning, holding heavy things  Relieving factors: NA  Treatment: NA    Is someone accompanying this pt? No    Is the patient using any DME equipment during OV? No    Depression Screening:      10/15/2024    10:26 AM 9/10/2024     2:12 PM 5/17/2024     3:37 PM 3/6/2024    11:00 AM   PHQ-9 Questionaire   Little interest or pleasure in doing things 0 0 1 0   Feeling down, depressed, or hopeless 0 0 1 0   Trouble falling or staying asleep, or sleeping too much 0 0 1    Feeling tired or having little energy 0 0 1    Poor appetite or overeating 0 0 1    Feeling bad about yourself - or that you are a failure or have let yourself or your family down 0 0 1    Trouble concentrating on things, such as reading the newspaper or watching television 0 0 2    Moving or speaking so slowly that other people could have noticed. Or the opposite - being so fidgety or restless that you have been moving around a lot more than usual 0 0 0    Thoughts that you would be better off dead, or of hurting yourself in some way 0 0 1    PHQ-9 Total Score 0 0 9 0   If you checked off any problems, how difficult have these problems made it for you to do your work, take care of things at home, or get along with other people? 0 0 2         NERY 7-Anxiety       10/15/2024    10:27 AM 9/10/2024     2:12 PM 5/17/2024     3:38 PM 3/6/2024    11:01 AM   NERY-7 SCREENING   Feeling nervous, anxious, or on edge Not at all Not at all More than half the days More than half the days   Not being able to stop or control worrying Not at all Not at all Nearly every day Several days   Worrying too much about different things Not at all Not at all Nearly every day Several days   Trouble relaxing Not

## 2024-10-15 NOTE — PROGRESS NOTES
Chief Complaint   Patient presents with    Follow-up    Wrist Injury     Right     Assessment & Plan:     1. Right wrist pain  Comments:  Acute;new   Start meloxicam 7.5 mg PRN for pain   Will order xray today   Awaiting results  Orders:  -     meloxicam (MOBIC) 7.5 MG tablet; Take 1 tablet by mouth daily as needed for Pain, Disp-30 tablet, R-0Normal  -     acetaminophen (TYLENOL) 500 MG tablet; Take 1 tablet by mouth 4 times daily as needed for Pain, Disp-360 tablet, R-0Normal  -     XR WRIST RIGHT (MIN 3 VIEWS); Future  2. Acute pain of right knee  Comments:  Advised to continue following with ortho   Start meloxicam 7.5 mg PRN for pain  Orders:  -     meloxicam (MOBIC) 7.5 MG tablet; Take 1 tablet by mouth daily as needed for Pain, Disp-30 tablet, R-0Normal  -     acetaminophen (TYLENOL) 500 MG tablet; Take 1 tablet by mouth 4 times daily as needed for Pain, Disp-360 tablet, R-0Normal  3. Prediabetes  Assessment & Plan:  Repeat labs ordered today to check status of A1C  Orders:  -     CBC with Auto Differential; Future  -     Comprehensive Metabolic Panel; Future  -     Lipid Panel; Future  -     Hemoglobin A1C; Future  4. Hyperlipidemia LDL goal <100  Assessment & Plan:  Repeat labs ordered  Awaiting results  Orders:  -     CBC with Auto Differential; Future  -     Comprehensive Metabolic Panel; Future  -     Lipid Panel; Future    Follow-up and Dispositions    Return in about 4 weeks (around 11/12/2024) for chronic disease management, lab results, extended visit - 30 min.       Subjective:     HPI  Acute Health Concerns  Wrist pain:  Onset: Injury occurring on 10/12/24  Location: Right wrist   Duration: Constant pain; 7/10   Characteristics: Sharp  Associated symptoms: Weakness   Aggravating factors: Movement   Relieving factors: None   Treatment: Tylenol 650 mg   States that injury occurred from a fall while taking a shower  Pt states that she is taking 4 tablets of tylenol 650 mg twice daily    Right knee:

## 2024-10-16 PROBLEM — E78.5 HYPERLIPIDEMIA LDL GOAL <100: Status: ACTIVE | Noted: 2024-10-16

## 2024-10-16 NOTE — ASSESSMENT & PLAN NOTE
Repeat labs ordered  Awaiting results   Alert and oriented x 3, normal mood and affect, no apparent risk to self or others.

## 2024-10-22 ENCOUNTER — TELEPHONE (OUTPATIENT)
Facility: CLINIC | Age: 34
End: 2024-10-22

## 2024-10-22 NOTE — TELEPHONE ENCOUNTER
----- Message from DAMIEN Lorenzana - CNP sent at 10/17/2024  4:37 PM EDT -----  Please inform patient that her xray was unremarkable. No acute fracture. Pain likely related to muscle strain from falling. Continue mobic 7.5 mg for next two weeks and RTC if pain continues. Thanks.

## 2025-03-20 ENCOUNTER — OFFICE VISIT (OUTPATIENT)
Facility: CLINIC | Age: 35
End: 2025-03-20

## 2025-03-20 ENCOUNTER — HOSPITAL ENCOUNTER (OUTPATIENT)
Facility: HOSPITAL | Age: 35
Setting detail: SPECIMEN
Discharge: HOME | End: 2025-03-23

## 2025-03-20 VITALS
RESPIRATION RATE: 16 BRPM | HEIGHT: 65 IN | DIASTOLIC BLOOD PRESSURE: 72 MMHG | HEART RATE: 69 BPM | WEIGHT: 223 LBS | SYSTOLIC BLOOD PRESSURE: 116 MMHG | TEMPERATURE: 98.3 F | OXYGEN SATURATION: 98 % | BODY MASS INDEX: 37.15 KG/M2

## 2025-03-20 DIAGNOSIS — N76.0 ACUTE VAGINITIS: Primary | ICD-10-CM

## 2025-03-20 DIAGNOSIS — N76.0 ACUTE VAGINITIS: ICD-10-CM

## 2025-03-20 PROBLEM — E66.811 OBESITY (BMI 30.0-34.9): Chronic | Status: ACTIVE | Noted: 2017-10-11

## 2025-03-20 LAB
APPEARANCE UR: CLEAR
BACTERIA URNS QL MICRO: ABNORMAL /HPF
BILIRUB UR QL: NEGATIVE
BILIRUBIN, URINE, POC: NEGATIVE
BLOOD URINE, POC: ABNORMAL
COLOR UR: YELLOW
EPITH CASTS URNS QL MICRO: ABNORMAL /LPF (ref 0–5)
GLUCOSE UR STRIP.AUTO-MCNC: NEGATIVE MG/DL
GLUCOSE URINE, POC: NEGATIVE
HGB UR QL STRIP: NEGATIVE
KETONES UR QL STRIP.AUTO: ABNORMAL MG/DL
KETONES, URINE, POC: NEGATIVE
LEUKOCYTE ESTERASE UR QL STRIP.AUTO: NEGATIVE
LEUKOCYTE ESTERASE, URINE, POC: NEGATIVE
NITRITE UR QL STRIP.AUTO: NEGATIVE
NITRITE, URINE, POC: NEGATIVE
PH UR STRIP: 6 (ref 5–8)
PH, URINE, POC: 6 (ref 4.6–8)
PROT UR STRIP-MCNC: NEGATIVE MG/DL
PROTEIN,URINE, POC: NEGATIVE
RBC #/AREA URNS HPF: ABNORMAL /HPF (ref 0–5)
SP GR UR REFRACTOMETRY: 1.03 (ref 1–1.03)
SPECIFIC GRAVITY, URINE, POC: 1.02 (ref 1–1.03)
URINALYSIS CLARITY, POC: CLEAR
URINALYSIS COLOR, POC: YELLOW
UROBILINOGEN UR QL STRIP.AUTO: 1 EU/DL (ref 0.2–1)
UROBILINOGEN, POC: NORMAL
WBC URNS QL MICRO: ABNORMAL /HPF (ref 0–5)

## 2025-03-20 PROCEDURE — 81003 URINALYSIS AUTO W/O SCOPE: CPT

## 2025-03-20 PROCEDURE — 81001 URINALYSIS AUTO W/SCOPE: CPT

## 2025-03-20 PROCEDURE — 87491 CHLMYD TRACH DNA AMP PROBE: CPT

## 2025-03-20 PROCEDURE — 3078F DIAST BP <80 MM HG: CPT

## 2025-03-20 PROCEDURE — 87086 URINE CULTURE/COLONY COUNT: CPT

## 2025-03-20 PROCEDURE — 87147 CULTURE TYPE IMMUNOLOGIC: CPT

## 2025-03-20 PROCEDURE — 87591 N.GONORRHOEAE DNA AMP PROB: CPT

## 2025-03-20 PROCEDURE — 87801 DETECT AGNT MULT DNA AMPLI: CPT

## 2025-03-20 PROCEDURE — 87661 TRICHOMONAS VAGINALIS AMPLIF: CPT

## 2025-03-20 PROCEDURE — 87798 DETECT AGENT NOS DNA AMP: CPT

## 2025-03-20 PROCEDURE — 3074F SYST BP LT 130 MM HG: CPT

## 2025-03-20 PROCEDURE — 99214 OFFICE O/P EST MOD 30 MIN: CPT

## 2025-03-20 SDOH — ECONOMIC STABILITY: FOOD INSECURITY: WITHIN THE PAST 12 MONTHS, YOU WORRIED THAT YOUR FOOD WOULD RUN OUT BEFORE YOU GOT MONEY TO BUY MORE.: NEVER TRUE

## 2025-03-20 SDOH — ECONOMIC STABILITY: FOOD INSECURITY: WITHIN THE PAST 12 MONTHS, THE FOOD YOU BOUGHT JUST DIDN'T LAST AND YOU DIDN'T HAVE MONEY TO GET MORE.: NEVER TRUE

## 2025-03-20 ASSESSMENT — ANXIETY QUESTIONNAIRES
3. WORRYING TOO MUCH ABOUT DIFFERENT THINGS: NOT AT ALL
1. FEELING NERVOUS, ANXIOUS, OR ON EDGE: NOT AT ALL
4. TROUBLE RELAXING: NOT AT ALL
IF YOU CHECKED OFF ANY PROBLEMS ON THIS QUESTIONNAIRE, HOW DIFFICULT HAVE THESE PROBLEMS MADE IT FOR YOU TO DO YOUR WORK, TAKE CARE OF THINGS AT HOME, OR GET ALONG WITH OTHER PEOPLE: NOT DIFFICULT AT ALL
GAD7 TOTAL SCORE: 0
2. NOT BEING ABLE TO STOP OR CONTROL WORRYING: NOT AT ALL
7. FEELING AFRAID AS IF SOMETHING AWFUL MIGHT HAPPEN: NOT AT ALL
6. BECOMING EASILY ANNOYED OR IRRITABLE: NOT AT ALL
5. BEING SO RESTLESS THAT IT IS HARD TO SIT STILL: NOT AT ALL

## 2025-03-20 ASSESSMENT — PATIENT HEALTH QUESTIONNAIRE - PHQ9
4. FEELING TIRED OR HAVING LITTLE ENERGY: NOT AT ALL
SUM OF ALL RESPONSES TO PHQ QUESTIONS 1-9: 0
8. MOVING OR SPEAKING SO SLOWLY THAT OTHER PEOPLE COULD HAVE NOTICED. OR THE OPPOSITE, BEING SO FIGETY OR RESTLESS THAT YOU HAVE BEEN MOVING AROUND A LOT MORE THAN USUAL: NOT AT ALL
6. FEELING BAD ABOUT YOURSELF - OR THAT YOU ARE A FAILURE OR HAVE LET YOURSELF OR YOUR FAMILY DOWN: NOT AT ALL
SUM OF ALL RESPONSES TO PHQ QUESTIONS 1-9: 0
9. THOUGHTS THAT YOU WOULD BE BETTER OFF DEAD, OR OF HURTING YOURSELF: NOT AT ALL
SUM OF ALL RESPONSES TO PHQ QUESTIONS 1-9: 0
1. LITTLE INTEREST OR PLEASURE IN DOING THINGS: NOT AT ALL
SUM OF ALL RESPONSES TO PHQ QUESTIONS 1-9: 0
5. POOR APPETITE OR OVEREATING: NOT AT ALL
3. TROUBLE FALLING OR STAYING ASLEEP: NOT AT ALL
7. TROUBLE CONCENTRATING ON THINGS, SUCH AS READING THE NEWSPAPER OR WATCHING TELEVISION: NOT AT ALL
10. IF YOU CHECKED OFF ANY PROBLEMS, HOW DIFFICULT HAVE THESE PROBLEMS MADE IT FOR YOU TO DO YOUR WORK, TAKE CARE OF THINGS AT HOME, OR GET ALONG WITH OTHER PEOPLE: NOT DIFFICULT AT ALL
2. FEELING DOWN, DEPRESSED OR HOPELESS: NOT AT ALL

## 2025-03-20 ASSESSMENT — ENCOUNTER SYMPTOMS: SHORTNESS OF BREATH: 0

## 2025-03-20 NOTE — PROGRESS NOTES
Violeta Kaufman presents today for   Chief Complaint   Patient presents with    Urinary Tract Infection    Rash     Is someone accompanying this pt? No    Is the patient using any DME equipment during OV? No    Depression Screening:      10/15/2024    10:26 AM 9/10/2024     2:12 PM 5/17/2024     3:37 PM 3/6/2024    11:00 AM   PHQ-9 Questionaire   Little interest or pleasure in doing things 0 0 1 0   Feeling down, depressed, or hopeless 0 0 1 0   Trouble falling or staying asleep, or sleeping too much 0 0 1    Feeling tired or having little energy 0 0 1    Poor appetite or overeating 0 0 1    Feeling bad about yourself - or that you are a failure or have let yourself or your family down 0 0 1    Trouble concentrating on things, such as reading the newspaper or watching television 0 0 2    Moving or speaking so slowly that other people could have noticed. Or the opposite - being so fidgety or restless that you have been moving around a lot more than usual 0 0 0    Thoughts that you would be better off dead, or of hurting yourself in some way 0 0 1    PHQ-9 Total Score 0 0 9 0   If you checked off any problems, how difficult have these problems made it for you to do your work, take care of things at home, or get along with other people? 0 0 2         NERY 7-Anxiety       10/15/2024    10:27 AM 9/10/2024     2:12 PM 5/17/2024     3:38 PM 3/6/2024    11:01 AM   NERY-7 SCREENING   Feeling nervous, anxious, or on edge Not at all Not at all More than half the days More than half the days   Not being able to stop or control worrying Not at all Not at all Nearly every day Several days   Worrying too much about different things Not at all Not at all Nearly every day Several days   Trouble relaxing Not at all Not at all Nearly every day Several days   Being so restless that it is hard to sit still Not at all Not at all More than half the days Several days   Becoming easily annoyed or irritable Not at all Not at all More than half 
  Constitutional:       General: She is not in acute distress.     Appearance: She is not ill-appearing.   HENT:      Head: Normocephalic and atraumatic.   Cardiovascular:      Rate and Rhythm: Normal rate and regular rhythm.   Pulmonary:      Effort: Pulmonary effort is normal. No respiratory distress.      Breath sounds: No wheezing, rhonchi or rales.   Musculoskeletal:         General: Normal range of motion.   Skin:     General: Skin is warm and dry.   Neurological:      General: No focal deficit present.      Mental Status: She is alert.   Psychiatric:         Mood and Affect: Mood normal.         Thought Content: Thought content normal.         Judgment: Judgment normal.     Total Time Spent: 30 min   Raghav Ocampo, DAMIEN - CNP

## 2025-03-21 ENCOUNTER — RESULTS FOLLOW-UP (OUTPATIENT)
Facility: HOSPITAL | Age: 35
End: 2025-03-21

## 2025-03-21 DIAGNOSIS — B96.89 BACTERIAL VAGINOSIS: Primary | ICD-10-CM

## 2025-03-21 DIAGNOSIS — B37.31 VAGINAL YEAST INFECTION: ICD-10-CM

## 2025-03-21 DIAGNOSIS — N76.0 BACTERIAL VAGINOSIS: Primary | ICD-10-CM

## 2025-03-21 DIAGNOSIS — N30.00 ACUTE CYSTITIS WITHOUT HEMATURIA: ICD-10-CM

## 2025-03-22 LAB
BACTERIA SPEC CULT: ABNORMAL
BACTERIA SPEC CULT: ABNORMAL
CC UR VC: ABNORMAL
SERVICE CMNT-IMP: ABNORMAL

## 2025-03-23 LAB
A VAGINAE DNA VAG QL NAA+PROBE: ABNORMAL SCORE
BVAB2 DNA VAG QL NAA+PROBE: ABNORMAL SCORE
C ALBICANS DNA VAG QL NAA+PROBE: ABNORMAL
C GLABRATA DNA VAG QL NAA+PROBE: ABNORMAL
C TRACH RRNA SPEC QL NAA+PROBE: ABNORMAL
CANDIDA KRUSEI: ABNORMAL
CANDIDA LUSITANIAE, NAA: ABNORMAL
CANDIDA PARAPSILOSIS/TROPICALIS: ABNORMAL
MEGA1 DNA VAG QL NAA+PROBE: ABNORMAL SCORE
N GONORRHOEA RRNA SPEC QL NAA+PROBE: ABNORMAL
T VAGINALIS RRNA SPEC QL NAA+PROBE: ABNORMAL

## 2025-03-25 LAB
A VAGINAE DNA VAG QL NAA+PROBE: ABNORMAL SCORE
BVAB2 DNA VAG QL NAA+PROBE: ABNORMAL SCORE
C ALBICANS DNA VAG QL NAA+PROBE: POSITIVE
C GLABRATA DNA VAG QL NAA+PROBE: NEGATIVE
C TRACH RRNA SPEC QL NAA+PROBE: NEGATIVE
CANDIDA KRUSEI: NEGATIVE
CANDIDA LUSITANIAE, NAA: NEGATIVE
CANDIDA PARAPSILOSIS/TROPICALIS: NEGATIVE
MEGA1 DNA VAG QL NAA+PROBE: ABNORMAL SCORE
N GONORRHOEA RRNA SPEC QL NAA+PROBE: NEGATIVE
T VAGINALIS RRNA SPEC QL NAA+PROBE: NEGATIVE

## 2025-03-26 RX ORDER — AMOXICILLIN 875 MG/1
875 TABLET, COATED ORAL 2 TIMES DAILY
Qty: 20 TABLET | Refills: 0 | Status: SHIPPED | OUTPATIENT
Start: 2025-03-26 | End: 2025-04-05

## 2025-03-26 RX ORDER — FLUCONAZOLE 150 MG/1
TABLET ORAL
Qty: 2 TABLET | Refills: 0 | Status: SHIPPED | OUTPATIENT
Start: 2025-03-26

## 2025-03-26 RX ORDER — METRONIDAZOLE 500 MG/1
500 TABLET ORAL 2 TIMES DAILY
Qty: 14 TABLET | Refills: 0 | Status: SHIPPED | OUTPATIENT
Start: 2025-03-26 | End: 2025-04-02

## 2025-05-05 ENCOUNTER — OFFICE VISIT (OUTPATIENT)
Facility: CLINIC | Age: 35
End: 2025-05-05

## 2025-05-05 VITALS
HEART RATE: 53 BPM | HEIGHT: 65 IN | OXYGEN SATURATION: 98 % | BODY MASS INDEX: 37.09 KG/M2 | WEIGHT: 222.6 LBS | SYSTOLIC BLOOD PRESSURE: 104 MMHG | RESPIRATION RATE: 20 BRPM | TEMPERATURE: 98.1 F | DIASTOLIC BLOOD PRESSURE: 71 MMHG

## 2025-05-05 DIAGNOSIS — R21 SKIN RASH: Primary | ICD-10-CM

## 2025-05-05 DIAGNOSIS — R14.0 ABDOMINAL BLOATING: ICD-10-CM

## 2025-05-05 PROCEDURE — 3074F SYST BP LT 130 MM HG: CPT | Performed by: STUDENT IN AN ORGANIZED HEALTH CARE EDUCATION/TRAINING PROGRAM

## 2025-05-05 PROCEDURE — 99214 OFFICE O/P EST MOD 30 MIN: CPT | Performed by: STUDENT IN AN ORGANIZED HEALTH CARE EDUCATION/TRAINING PROGRAM

## 2025-05-05 PROCEDURE — 3078F DIAST BP <80 MM HG: CPT | Performed by: STUDENT IN AN ORGANIZED HEALTH CARE EDUCATION/TRAINING PROGRAM

## 2025-05-05 RX ORDER — TRIAMCINOLONE ACETONIDE 0.25 MG/G
OINTMENT TOPICAL
Qty: 15 G | Refills: 1 | Status: SHIPPED | OUTPATIENT
Start: 2025-05-05 | End: 2025-05-12

## 2025-05-05 SDOH — ECONOMIC STABILITY: FOOD INSECURITY: WITHIN THE PAST 12 MONTHS, THE FOOD YOU BOUGHT JUST DIDN'T LAST AND YOU DIDN'T HAVE MONEY TO GET MORE.: NEVER TRUE

## 2025-05-05 SDOH — ECONOMIC STABILITY: FOOD INSECURITY: WITHIN THE PAST 12 MONTHS, YOU WORRIED THAT YOUR FOOD WOULD RUN OUT BEFORE YOU GOT MONEY TO BUY MORE.: NEVER TRUE

## 2025-05-05 ASSESSMENT — ANXIETY QUESTIONNAIRES
2. NOT BEING ABLE TO STOP OR CONTROL WORRYING: SEVERAL DAYS
7. FEELING AFRAID AS IF SOMETHING AWFUL MIGHT HAPPEN: NOT AT ALL
4. TROUBLE RELAXING: MORE THAN HALF THE DAYS
GAD7 TOTAL SCORE: 8
3. WORRYING TOO MUCH ABOUT DIFFERENT THINGS: MORE THAN HALF THE DAYS
IF YOU CHECKED OFF ANY PROBLEMS ON THIS QUESTIONNAIRE, HOW DIFFICULT HAVE THESE PROBLEMS MADE IT FOR YOU TO DO YOUR WORK, TAKE CARE OF THINGS AT HOME, OR GET ALONG WITH OTHER PEOPLE: SOMEWHAT DIFFICULT
5. BEING SO RESTLESS THAT IT IS HARD TO SIT STILL: MORE THAN HALF THE DAYS
6. BECOMING EASILY ANNOYED OR IRRITABLE: SEVERAL DAYS
1. FEELING NERVOUS, ANXIOUS, OR ON EDGE: NOT AT ALL

## 2025-05-05 ASSESSMENT — PATIENT HEALTH QUESTIONNAIRE - PHQ9
1. LITTLE INTEREST OR PLEASURE IN DOING THINGS: NOT AT ALL
SUM OF ALL RESPONSES TO PHQ QUESTIONS 1-9: 0
2. FEELING DOWN, DEPRESSED OR HOPELESS: NOT AT ALL

## 2025-05-05 NOTE — PROGRESS NOTES
SICK VISIT     Violeta Kaufman (: 1990) is a 34 y.o. female here for evaluation of the following chief concerns(s):  abdominal fullness and skin issue       ASSESSMENT/PLAN:    Assessment & Plan  2. Rash.  - Pt report sporadic pruritic rashs appears randomly over her body.    - She report that she has lupus and was diagnosed at young age and has rashes on the face that has the butterfly distribution.  - She is not on medication for lupus, but takes steroid here and there when she has outbreaks.  - OTC Hydrocortisone cream has been used with some effectiveness, but the rash recurs.  - Lab work up in 2020 for OPHELIA comprehensive panel was negative   -Advised pt that she might not have lupus and test in her youth might be a false positive.  - Will reach out to PCP to get OPHELIA work up to determine if she is positive for lupus. If the OPHELIA test is positive, further evaluation will be needed. If it is negative, lupus is unlikely.  - Advised Over-the-counter antihistamines such as Allegra, Zyrtec, or Claritin have been recommended for itching.  - Prescription for triamcinolone cream has been provided, with instructions to apply it only when the rash is present and to avoid excessive use on the face. - Pt report having skin biopsy done in the past for same problem.  She has been advised to obtain her dermatology report for further evaluation.  -Recommend sun avoidance to see if it helps.        2. Bloating.  - Concern of diet issues.     - She has been advised to eliminate dairy from her diet to assess its impact on her bloating.  - Discussed the possibility of lactose intolerance contributing to bloating.  - Reduction in of specific vegetable intake has also been suggested to manage bloating.  - Recommendations include avoiding smoothies, milkshakes, and other similar drinks.  - Advised to shift towards a diet rich in fruits and vegetables, reducing carbohydrate intake, and increasing water consumption.  -Also

## 2025-05-05 NOTE — PROGRESS NOTES
Violeta Kaufman presents today for No chief complaint on file.        Is someone accompanying this pt? no    Is the patient using any DME equipment during OV? no    Depression Screening:      3/20/2025    11:50 AM 10/15/2024    10:26 AM 9/10/2024     2:12 PM 5/17/2024     3:37 PM 3/6/2024    11:00 AM   PHQ-9 Questionaire   Little interest or pleasure in doing things 0 0 0 1 0   Feeling down, depressed, or hopeless 0 0 0 1 0   Trouble falling or staying asleep, or sleeping too much 0 0 0 1    Feeling tired or having little energy 0 0 0 1    Poor appetite or overeating 0 0 0 1    Feeling bad about yourself - or that you are a failure or have let yourself or your family down 0 0 0 1    Trouble concentrating on things, such as reading the newspaper or watching television 0 0 0 2    Moving or speaking so slowly that other people could have noticed. Or the opposite - being so fidgety or restless that you have been moving around a lot more than usual 0 0 0 0    Thoughts that you would be better off dead, or of hurting yourself in some way 0 0 0 1    PHQ-9 Total Score 0 0 0 9 0   If you checked off any problems, how difficult have these problems made it for you to do your work, take care of things at home, or get along with other people? 0 0 0 2         NERY 7-Anxiety       3/20/2025    11:50 AM 10/15/2024    10:27 AM 9/10/2024     2:12 PM 5/17/2024     3:38 PM 3/6/2024    11:01 AM   NERY-7 SCREENING   Feeling nervous, anxious, or on edge Not at all Not at all Not at all More than half the days More than half the days   Not being able to stop or control worrying Not at all Not at all Not at all Nearly every day Several days   Worrying too much about different things Not at all Not at all Not at all Nearly every day Several days   Trouble relaxing Not at all Not at all Not at all Nearly every day Several days   Being so restless that it is hard to sit still Not at all Not at all Not at all More than half the days Several days